# Patient Record
Sex: FEMALE | Race: WHITE | Employment: OTHER | URBAN - METROPOLITAN AREA
[De-identification: names, ages, dates, MRNs, and addresses within clinical notes are randomized per-mention and may not be internally consistent; named-entity substitution may affect disease eponyms.]

---

## 2017-01-01 ENCOUNTER — HOSPITAL ENCOUNTER (OUTPATIENT)
Dept: LAB | Age: 82
Discharge: HOME | End: 2017-01-26
Payer: MEDICARE

## 2017-01-01 ENCOUNTER — DOCUMENTATION ONLY (OUTPATIENT)
Dept: INTERNAL MEDICINE CLINIC | Age: 82
End: 2017-01-01

## 2017-01-01 ENCOUNTER — HOSPITAL ENCOUNTER (INPATIENT)
Age: 82
LOS: 2 days | Discharge: HOSPICE | DRG: 871 | End: 2017-10-14
Attending: EMERGENCY MEDICINE | Admitting: HOSPITALIST
Payer: MEDICARE

## 2017-01-01 ENCOUNTER — TELEPHONE (OUTPATIENT)
Dept: INTERNAL MEDICINE CLINIC | Age: 82
End: 2017-01-01

## 2017-01-01 ENCOUNTER — APPOINTMENT (OUTPATIENT)
Dept: GENERAL RADIOLOGY | Age: 82
DRG: 689 | End: 2017-01-01
Attending: EMERGENCY MEDICINE
Payer: MEDICARE

## 2017-01-01 ENCOUNTER — APPOINTMENT (OUTPATIENT)
Dept: GENERAL RADIOLOGY | Age: 82
DRG: 871 | End: 2017-01-01
Attending: PHYSICIAN ASSISTANT
Payer: MEDICARE

## 2017-01-01 ENCOUNTER — HOSPICE ADMISSION (OUTPATIENT)
Dept: HOSPICE | Facility: HOSPICE | Age: 82
End: 2017-01-01
Payer: MEDICARE

## 2017-01-01 ENCOUNTER — OFFICE VISIT (OUTPATIENT)
Dept: INTERNAL MEDICINE CLINIC | Age: 82
End: 2017-01-01

## 2017-01-01 ENCOUNTER — HOSPITAL ENCOUNTER (INPATIENT)
Age: 82
LOS: 4 days | End: 2017-10-18
Attending: INTERNAL MEDICINE | Admitting: INTERNAL MEDICINE
Payer: MEDICARE

## 2017-01-01 ENCOUNTER — APPOINTMENT (OUTPATIENT)
Dept: CT IMAGING | Age: 82
DRG: 689 | End: 2017-01-01
Attending: EMERGENCY MEDICINE
Payer: MEDICARE

## 2017-01-01 ENCOUNTER — PATIENT OUTREACH (OUTPATIENT)
Dept: INTERNAL MEDICINE CLINIC | Age: 82
End: 2017-01-01

## 2017-01-01 ENCOUNTER — HOSPITAL ENCOUNTER (INPATIENT)
Age: 82
LOS: 3 days | Discharge: HOME HEALTH | DRG: 689 | End: 2017-03-20
Attending: EMERGENCY MEDICINE | Admitting: INTERNAL MEDICINE
Payer: MEDICARE

## 2017-01-01 VITALS
BODY MASS INDEX: 27.71 KG/M2 | RESPIRATION RATE: 20 BRPM | DIASTOLIC BLOOD PRESSURE: 60 MMHG | HEIGHT: 63 IN | SYSTOLIC BLOOD PRESSURE: 140 MMHG | WEIGHT: 156.4 LBS | OXYGEN SATURATION: 96 % | HEART RATE: 62 BPM | TEMPERATURE: 96 F

## 2017-01-01 VITALS
HEIGHT: 63 IN | SYSTOLIC BLOOD PRESSURE: 98 MMHG | OXYGEN SATURATION: 97 % | BODY MASS INDEX: 26.4 KG/M2 | HEART RATE: 56 BPM | DIASTOLIC BLOOD PRESSURE: 44 MMHG | TEMPERATURE: 93.5 F | WEIGHT: 149 LBS | RESPIRATION RATE: 16 BRPM

## 2017-01-01 VITALS
HEIGHT: 63 IN | HEART RATE: 59 BPM | WEIGHT: 149.5 LBS | TEMPERATURE: 96.4 F | DIASTOLIC BLOOD PRESSURE: 60 MMHG | OXYGEN SATURATION: 98 % | SYSTOLIC BLOOD PRESSURE: 110 MMHG | BODY MASS INDEX: 26.49 KG/M2 | RESPIRATION RATE: 18 BRPM

## 2017-01-01 VITALS
HEART RATE: 58 BPM | WEIGHT: 166.6 LBS | SYSTOLIC BLOOD PRESSURE: 116 MMHG | DIASTOLIC BLOOD PRESSURE: 68 MMHG | BODY MASS INDEX: 29.52 KG/M2 | OXYGEN SATURATION: 97 % | HEIGHT: 63 IN

## 2017-01-01 VITALS
SYSTOLIC BLOOD PRESSURE: 90 MMHG | OXYGEN SATURATION: 88 % | HEART RATE: 48 BPM | RESPIRATION RATE: 16 BRPM | DIASTOLIC BLOOD PRESSURE: 54 MMHG | TEMPERATURE: 97.1 F

## 2017-01-01 VITALS
HEIGHT: 63 IN | SYSTOLIC BLOOD PRESSURE: 140 MMHG | RESPIRATION RATE: 18 BRPM | DIASTOLIC BLOOD PRESSURE: 57 MMHG | TEMPERATURE: 96 F | OXYGEN SATURATION: 99 % | WEIGHT: 167.33 LBS | BODY MASS INDEX: 29.65 KG/M2 | HEART RATE: 95 BPM

## 2017-01-01 DIAGNOSIS — N30.00 ACUTE CYSTITIS WITHOUT HEMATURIA: Primary | ICD-10-CM

## 2017-01-01 DIAGNOSIS — R06.02 SHORTNESS OF BREATH: ICD-10-CM

## 2017-01-01 DIAGNOSIS — I48.0 PAROXYSMAL ATRIAL FIBRILLATION (HCC): ICD-10-CM

## 2017-01-01 DIAGNOSIS — R31.9 URINARY TRACT INFECTION WITH HEMATURIA, SITE UNSPECIFIED: Primary | ICD-10-CM

## 2017-01-01 DIAGNOSIS — I10 ESSENTIAL HYPERTENSION: ICD-10-CM

## 2017-01-01 DIAGNOSIS — N39.0 URINARY TRACT INFECTION WITH HEMATURIA, SITE UNSPECIFIED: Primary | ICD-10-CM

## 2017-01-01 DIAGNOSIS — K11.7 INCREASED OROPHARYNGEAL SECRETIONS: ICD-10-CM

## 2017-01-01 DIAGNOSIS — Z13.39 SCREENING FOR ALCOHOLISM: ICD-10-CM

## 2017-01-01 DIAGNOSIS — F03.C0 ADVANCED DEMENTIA: ICD-10-CM

## 2017-01-01 DIAGNOSIS — I87.2 VENOUS INSUFFICIENCY OF BOTH LOWER EXTREMITIES: ICD-10-CM

## 2017-01-01 DIAGNOSIS — A41.9 SEPSIS, DUE TO UNSPECIFIED ORGANISM: Primary | ICD-10-CM

## 2017-01-01 DIAGNOSIS — E03.4 HYPOTHYROIDISM DUE TO ACQUIRED ATROPHY OF THYROID: ICD-10-CM

## 2017-01-01 DIAGNOSIS — E87.0 HYPERNATREMIA: ICD-10-CM

## 2017-01-01 DIAGNOSIS — R31.9 URINARY TRACT INFECTION WITH HEMATURIA, SITE UNSPECIFIED: ICD-10-CM

## 2017-01-01 DIAGNOSIS — A41.9 SEPSIS, DUE TO UNSPECIFIED ORGANISM: ICD-10-CM

## 2017-01-01 DIAGNOSIS — N39.0 URINARY TRACT INFECTION WITH HEMATURIA, SITE UNSPECIFIED: ICD-10-CM

## 2017-01-01 DIAGNOSIS — G31.84 MILD COGNITIVE IMPAIRMENT WITH MEMORY LOSS: ICD-10-CM

## 2017-01-01 DIAGNOSIS — M81.0 OSTEOPOROSIS: Primary | ICD-10-CM

## 2017-01-01 DIAGNOSIS — N17.9 AKI (ACUTE KIDNEY INJURY) (HCC): ICD-10-CM

## 2017-01-01 DIAGNOSIS — Z79.01 CHRONIC ANTICOAGULATION: ICD-10-CM

## 2017-01-01 DIAGNOSIS — R52 GENERALIZED PAIN: ICD-10-CM

## 2017-01-01 DIAGNOSIS — Z00.00 ROUTINE GENERAL MEDICAL EXAMINATION AT A HEALTH CARE FACILITY: Primary | ICD-10-CM

## 2017-01-01 DIAGNOSIS — G93.40 ACUTE ENCEPHALOPATHY: ICD-10-CM

## 2017-01-01 DIAGNOSIS — F41.8 DEPRESSION WITH ANXIETY: ICD-10-CM

## 2017-01-01 DIAGNOSIS — E44.0 MODERATE PROTEIN-CALORIE MALNUTRITION (HCC): ICD-10-CM

## 2017-01-01 LAB
25(OH)D3+25(OH)D2 SERPL-MCNC: 35.8 NG/ML (ref 30–100)
ALBUMIN SERPL BCP-MCNC: 2.6 G/DL (ref 3.5–5)
ALBUMIN SERPL BCP-MCNC: 3.1 G/DL (ref 3.5–5)
ALBUMIN SERPL-MCNC: 1.8 G/DL (ref 3.5–5)
ALBUMIN SERPL-MCNC: 2.4 G/DL (ref 3.5–5)
ALBUMIN SERPL-MCNC: 3.9 G/DL (ref 3.2–4.6)
ALBUMIN/GLOB SERPL: 0.5 {RATIO} (ref 1.1–2.2)
ALBUMIN/GLOB SERPL: 0.5 {RATIO} (ref 1.1–2.2)
ALBUMIN/GLOB SERPL: 0.7 {RATIO} (ref 1.1–2.2)
ALBUMIN/GLOB SERPL: 0.7 {RATIO} (ref 1.1–2.2)
ALBUMIN/GLOB SERPL: 1.4 {RATIO} (ref 1.1–2.5)
ALP SERPL-CCNC: 102 U/L (ref 45–117)
ALP SERPL-CCNC: 118 U/L (ref 45–117)
ALP SERPL-CCNC: 120 U/L (ref 45–117)
ALP SERPL-CCNC: 158 U/L (ref 45–117)
ALP SERPL-CCNC: 96 IU/L (ref 39–117)
ALT SERPL-CCNC: 21 IU/L (ref 0–32)
ALT SERPL-CCNC: 39 U/L (ref 12–78)
ALT SERPL-CCNC: 42 U/L (ref 12–78)
ALT SERPL-CCNC: 48 U/L (ref 12–78)
ALT SERPL-CCNC: 65 U/L (ref 12–78)
ANION GAP BLD CALC-SCNC: 10 MMOL/L (ref 5–15)
ANION GAP BLD CALC-SCNC: 11 MMOL/L (ref 5–15)
ANION GAP BLD CALC-SCNC: 8 MMOL/L (ref 5–15)
ANION GAP SERPL CALC-SCNC: 5 MMOL/L (ref 5–15)
ANION GAP SERPL CALC-SCNC: 6 MMOL/L (ref 5–15)
APPEARANCE UR: ABNORMAL
APPEARANCE UR: ABNORMAL
AST SERPL W P-5'-P-CCNC: 41 U/L (ref 15–37)
AST SERPL W P-5'-P-CCNC: 46 U/L (ref 15–37)
AST SERPL-CCNC: 25 IU/L (ref 0–40)
AST SERPL-CCNC: 37 U/L (ref 15–37)
AST SERPL-CCNC: ABNORMAL U/L (ref 15–37)
ATRIAL RATE: 45 BPM
ATRIAL RATE: 64 BPM
BACTERIA SPEC CULT: ABNORMAL
BACTERIA SPEC CULT: ABNORMAL
BACTERIA SPEC CULT: NORMAL
BACTERIA SPEC CULT: NORMAL
BACTERIA URNS QL MICRO: ABNORMAL /HPF
BACTERIA URNS QL MICRO: ABNORMAL /HPF
BASOPHILS # BLD AUTO: 0 K/UL (ref 0–0.1)
BASOPHILS # BLD AUTO: 0 K/UL (ref 0–0.1)
BASOPHILS # BLD: 0 % (ref 0–1)
BASOPHILS # BLD: 0 % (ref 0–1)
BASOPHILS # BLD: 0 K/UL (ref 0–0.1)
BASOPHILS NFR BLD: 0 % (ref 0–1)
BILIRUB SERPL-MCNC: 0.3 MG/DL (ref 0–1.2)
BILIRUB SERPL-MCNC: 0.5 MG/DL (ref 0.2–1)
BILIRUB SERPL-MCNC: 0.5 MG/DL (ref 0.2–1)
BILIRUB SERPL-MCNC: 0.6 MG/DL (ref 0.2–1)
BILIRUB SERPL-MCNC: 0.8 MG/DL (ref 0.2–1)
BILIRUB UR QL: NEGATIVE
BILIRUB UR QL: NEGATIVE
BUN SERPL-MCNC: 107 MG/DL (ref 6–20)
BUN SERPL-MCNC: 22 MG/DL (ref 6–20)
BUN SERPL-MCNC: 30 MG/DL (ref 6–20)
BUN SERPL-MCNC: 36 MG/DL (ref 10–36)
BUN SERPL-MCNC: 38 MG/DL (ref 6–20)
BUN SERPL-MCNC: 92 MG/DL (ref 6–20)
BUN/CREAT SERPL: 22 (ref 12–20)
BUN/CREAT SERPL: 26 (ref 12–20)
BUN/CREAT SERPL: 27 (ref 12–20)
BUN/CREAT SERPL: 29 (ref 12–20)
BUN/CREAT SERPL: 35 (ref 12–20)
BUN/CREAT SERPL: 37 (ref 11–26)
CALCIUM SERPL-MCNC: 10.1 MG/DL (ref 8.5–10.1)
CALCIUM SERPL-MCNC: 10.1 MG/DL (ref 8.7–10.3)
CALCIUM SERPL-MCNC: 10.5 MG/DL (ref 8.5–10.1)
CALCIUM SERPL-MCNC: 8.4 MG/DL (ref 8.5–10.1)
CALCIUM SERPL-MCNC: 9.6 MG/DL (ref 8.5–10.1)
CALCIUM SERPL-MCNC: 9.9 MG/DL (ref 8.5–10.1)
CALCULATED P AXIS, ECG09: 43 DEGREES
CALCULATED P AXIS, ECG09: 56 DEGREES
CALCULATED R AXIS, ECG10: -39 DEGREES
CALCULATED R AXIS, ECG10: -49 DEGREES
CALCULATED T AXIS, ECG11: 16 DEGREES
CALCULATED T AXIS, ECG11: 24 DEGREES
CC UR VC: ABNORMAL
CC UR VC: ABNORMAL
CHLORIDE SERPL-SCNC: 101 MMOL/L (ref 96–106)
CHLORIDE SERPL-SCNC: 106 MMOL/L (ref 97–108)
CHLORIDE SERPL-SCNC: 108 MMOL/L (ref 97–108)
CHLORIDE SERPL-SCNC: 109 MMOL/L (ref 97–108)
CHLORIDE SERPL-SCNC: 119 MMOL/L (ref 97–108)
CHLORIDE SERPL-SCNC: 121 MMOL/L (ref 97–108)
CK MB CFR SERPL CALC: 4.7 % (ref 0–2.5)
CK MB CFR SERPL CALC: 5 % (ref 0–2.5)
CK MB CFR SERPL CALC: 5.5 % (ref 0–2.5)
CK MB SERPL-MCNC: 14.8 NG/ML (ref 5–25)
CK MB SERPL-MCNC: 18.2 NG/ML (ref 5–25)
CK MB SERPL-MCNC: 9.1 NG/ML (ref 5–25)
CK SERPL-CCNC: 193 U/L (ref 26–192)
CK SERPL-CCNC: 269 U/L (ref 26–192)
CK SERPL-CCNC: 363 U/L (ref 26–192)
CO2 SERPL-SCNC: 23 MMOL/L (ref 21–32)
CO2 SERPL-SCNC: 25 MMOL/L (ref 18–29)
CO2 SERPL-SCNC: 25 MMOL/L (ref 21–32)
CO2 SERPL-SCNC: 26 MMOL/L (ref 21–32)
CO2 SERPL-SCNC: 27 MMOL/L (ref 21–32)
CO2 SERPL-SCNC: 28 MMOL/L (ref 21–32)
COLOR UR: ABNORMAL
COLOR UR: YELLOW
CORTIS SERPL-MCNC: 29.6 UG/DL
CREAT SERPL-MCNC: 0.98 MG/DL (ref 0.55–1.02)
CREAT SERPL-MCNC: 0.98 MG/DL (ref 0.57–1)
CREAT SERPL-MCNC: 1.04 MG/DL (ref 0.55–1.02)
CREAT SERPL-MCNC: 1.1 MG/DL (ref 0.55–1.02)
CREAT SERPL-MCNC: 3.45 MG/DL (ref 0.55–1.02)
CREAT SERPL-MCNC: 4.11 MG/DL (ref 0.55–1.02)
DIAGNOSIS, 93000: NORMAL
DIAGNOSIS, 93000: NORMAL
DIFFERENTIAL METHOD BLD: ABNORMAL
EOSINOPHIL # BLD: 0.1 K/UL (ref 0–0.4)
EOSINOPHIL # BLD: 0.1 K/UL (ref 0–0.4)
EOSINOPHIL # BLD: 0.2 K/UL (ref 0–0.4)
EOSINOPHIL NFR BLD: 1 % (ref 0–7)
EOSINOPHIL NFR BLD: 1 % (ref 0–7)
EOSINOPHIL NFR BLD: 3 % (ref 0–7)
EPITH CASTS URNS QL MICRO: ABNORMAL /LPF
EPITH CASTS URNS QL MICRO: ABNORMAL /LPF
ERYTHROCYTE [DISTWIDTH] IN BLOOD BY AUTOMATED COUNT: 16.2 % (ref 12.3–15.4)
ERYTHROCYTE [DISTWIDTH] IN BLOOD BY AUTOMATED COUNT: 16.8 % (ref 11.5–14.5)
ERYTHROCYTE [DISTWIDTH] IN BLOOD BY AUTOMATED COUNT: 16.9 % (ref 11.5–14.5)
ERYTHROCYTE [DISTWIDTH] IN BLOOD BY AUTOMATED COUNT: 23.5 % (ref 11.5–14.5)
GLOBULIN SER CALC-MCNC: 2.8 G/DL (ref 1.5–4.5)
GLOBULIN SER CALC-MCNC: 3.5 G/DL (ref 2–4)
GLOBULIN SER CALC-MCNC: 3.9 G/DL (ref 2–4)
GLOBULIN SER CALC-MCNC: 4.3 G/DL (ref 2–4)
GLOBULIN SER CALC-MCNC: 5.2 G/DL (ref 2–4)
GLUCOSE BLD STRIP.AUTO-MCNC: 92 MG/DL (ref 65–100)
GLUCOSE SERPL-MCNC: 106 MG/DL (ref 65–100)
GLUCOSE SERPL-MCNC: 67 MG/DL (ref 65–100)
GLUCOSE SERPL-MCNC: 72 MG/DL (ref 65–100)
GLUCOSE SERPL-MCNC: 78 MG/DL (ref 65–100)
GLUCOSE SERPL-MCNC: 89 MG/DL (ref 65–100)
GLUCOSE SERPL-MCNC: 92 MG/DL (ref 65–99)
GLUCOSE UR STRIP.AUTO-MCNC: NEGATIVE MG/DL
GLUCOSE UR STRIP.AUTO-MCNC: NEGATIVE MG/DL
HCT VFR BLD AUTO: 33.4 % (ref 35–47)
HCT VFR BLD AUTO: 36.2 % (ref 34–46.6)
HCT VFR BLD AUTO: 36.2 % (ref 35–47)
HCT VFR BLD AUTO: 37.8 % (ref 35–47)
HGB BLD-MCNC: 10.8 G/DL (ref 11.5–16)
HGB BLD-MCNC: 11.8 G/DL (ref 11.5–16)
HGB BLD-MCNC: 12 G/DL (ref 11.1–15.9)
HGB BLD-MCNC: 12 G/DL (ref 11.5–16)
HGB UR QL STRIP: ABNORMAL
HGB UR QL STRIP: NEGATIVE
INR PPP: 1.3 (ref 0.9–1.1)
INTERPRETATION: NORMAL
KETONES UR QL STRIP.AUTO: ABNORMAL MG/DL
KETONES UR QL STRIP.AUTO: NEGATIVE MG/DL
LACTATE SERPL-SCNC: 0.8 MMOL/L (ref 0.4–2)
LACTATE SERPL-SCNC: 1.5 MMOL/L (ref 0.4–2)
LEUKOCYTE ESTERASE UR QL STRIP.AUTO: ABNORMAL
LEUKOCYTE ESTERASE UR QL STRIP.AUTO: ABNORMAL
LYMPHOCYTES # BLD AUTO: 15 % (ref 12–49)
LYMPHOCYTES # BLD AUTO: 21 % (ref 12–49)
LYMPHOCYTES # BLD: 1 K/UL (ref 0.8–3.5)
LYMPHOCYTES # BLD: 1.1 K/UL (ref 0.8–3.5)
LYMPHOCYTES # BLD: 1.3 K/UL (ref 0.8–3.5)
LYMPHOCYTES NFR BLD: 15 % (ref 12–49)
MAGNESIUM SERPL-MCNC: 1.7 MG/DL (ref 1.6–2.4)
MAGNESIUM SERPL-MCNC: 2.1 MG/DL (ref 1.6–2.4)
MCH RBC QN AUTO: 26 PG (ref 26–34)
MCH RBC QN AUTO: 27.6 PG (ref 26–34)
MCH RBC QN AUTO: 27.6 PG (ref 26–34)
MCH RBC QN AUTO: 27.9 PG (ref 26.6–33)
MCHC RBC AUTO-ENTMCNC: 31.7 G/DL (ref 30–36.5)
MCHC RBC AUTO-ENTMCNC: 32.3 G/DL (ref 30–36.5)
MCHC RBC AUTO-ENTMCNC: 32.6 G/DL (ref 30–36.5)
MCHC RBC AUTO-ENTMCNC: 33.1 G/DL (ref 31.5–35.7)
MCV RBC AUTO: 81.8 FL (ref 80–99)
MCV RBC AUTO: 84 FL (ref 79–97)
MCV RBC AUTO: 84.6 FL (ref 80–99)
MCV RBC AUTO: 85.4 FL (ref 80–99)
MONOCYTES # BLD: 0.5 K/UL (ref 0–1)
MONOCYTES # BLD: 0.5 K/UL (ref 0–1)
MONOCYTES # BLD: 0.8 K/UL (ref 0–1)
MONOCYTES NFR BLD AUTO: 8 % (ref 5–13)
MONOCYTES NFR BLD AUTO: 8 % (ref 5–13)
MONOCYTES NFR BLD: 9 % (ref 5–13)
NEUTS SEG # BLD: 3.8 K/UL (ref 1.8–8)
NEUTS SEG # BLD: 4.8 K/UL (ref 1.8–8)
NEUTS SEG # BLD: 6.4 K/UL (ref 1.8–8)
NEUTS SEG NFR BLD AUTO: 70 % (ref 32–75)
NEUTS SEG NFR BLD AUTO: 74 % (ref 32–75)
NEUTS SEG NFR BLD: 75 % (ref 32–75)
NITRITE UR QL STRIP.AUTO: POSITIVE
NITRITE UR QL STRIP.AUTO: POSITIVE
P-R INTERVAL, ECG05: 210 MS
P-R INTERVAL, ECG05: 224 MS
PH UR STRIP: 6 [PH] (ref 5–8)
PH UR STRIP: 6 [PH] (ref 5–8)
PHOSPHATE SERPL-MCNC: 1.8 MG/DL (ref 2.6–4.7)
PHOSPHATE SERPL-MCNC: 2.1 MG/DL (ref 2.6–4.7)
PLATELET # BLD AUTO: 195 K/UL (ref 150–400)
PLATELET # BLD AUTO: 202 X10E3/UL (ref 150–379)
PLATELET # BLD AUTO: 203 K/UL (ref 150–400)
PLATELET # BLD AUTO: 213 K/UL (ref 150–400)
POTASSIUM SERPL-SCNC: 3.4 MMOL/L (ref 3.5–5.1)
POTASSIUM SERPL-SCNC: 3.7 MMOL/L (ref 3.5–5.1)
POTASSIUM SERPL-SCNC: 4.5 MMOL/L (ref 3.5–5.1)
POTASSIUM SERPL-SCNC: 4.6 MMOL/L (ref 3.5–5.1)
POTASSIUM SERPL-SCNC: 5 MMOL/L (ref 3.5–5.2)
POTASSIUM SERPL-SCNC: ABNORMAL MMOL/L (ref 3.5–5.1)
PROT SERPL-MCNC: 5.3 G/DL (ref 6.4–8.2)
PROT SERPL-MCNC: 6.5 G/DL (ref 6.4–8.2)
PROT SERPL-MCNC: 6.7 G/DL (ref 6–8.5)
PROT SERPL-MCNC: 7.4 G/DL (ref 6.4–8.2)
PROT SERPL-MCNC: 7.6 G/DL (ref 6.4–8.2)
PROT UR STRIP-MCNC: 100 MG/DL
PROT UR STRIP-MCNC: NEGATIVE MG/DL
PROTHROMBIN TIME: 12.7 SEC (ref 9–11.1)
Q-T INTERVAL, ECG07: 434 MS
Q-T INTERVAL, ECG07: 532 MS
QRS DURATION, ECG06: 104 MS
QRS DURATION, ECG06: 112 MS
QTC CALCULATION (BEZET), ECG08: 447 MS
QTC CALCULATION (BEZET), ECG08: 460 MS
RBC # BLD AUTO: 3.91 M/UL (ref 3.8–5.2)
RBC # BLD AUTO: 4.28 M/UL (ref 3.8–5.2)
RBC # BLD AUTO: 4.3 X10E6/UL (ref 3.77–5.28)
RBC # BLD AUTO: 4.62 M/UL (ref 3.8–5.2)
RBC #/AREA URNS HPF: ABNORMAL /HPF (ref 0–5)
RBC #/AREA URNS HPF: ABNORMAL /HPF (ref 0–5)
RBC MORPH BLD: ABNORMAL
RBC MORPH BLD: ABNORMAL
SERVICE CMNT-IMP: ABNORMAL
SERVICE CMNT-IMP: ABNORMAL
SERVICE CMNT-IMP: NORMAL
SODIUM SERPL-SCNC: 140 MMOL/L (ref 136–145)
SODIUM SERPL-SCNC: 141 MMOL/L (ref 134–144)
SODIUM SERPL-SCNC: 144 MMOL/L (ref 136–145)
SODIUM SERPL-SCNC: 144 MMOL/L (ref 136–145)
SODIUM SERPL-SCNC: 150 MMOL/L (ref 136–145)
SODIUM SERPL-SCNC: 154 MMOL/L (ref 136–145)
SP GR UR REFRACTOMETRY: 1.02 (ref 1–1.03)
SP GR UR REFRACTOMETRY: 1.02 (ref 1–1.03)
T3FREE SERPL-MCNC: 2.1 PG/ML (ref 2.2–4)
T4 FREE SERPL-MCNC: 1.2 NG/DL (ref 0.8–1.5)
T4 FREE SERPL-MCNC: 1.3 NG/DL (ref 0.8–1.5)
TROPONIN I SERPL-MCNC: <0.04 NG/ML
TSH SERPL DL<=0.05 MIU/L-ACNC: 3.77 UIU/ML (ref 0.36–3.74)
TSH SERPL DL<=0.05 MIU/L-ACNC: 6.29 UIU/ML (ref 0.36–3.74)
TSH SERPL DL<=0.05 MIU/L-ACNC: 7.04 UIU/ML (ref 0.36–3.74)
UROBILINOGEN UR QL STRIP.AUTO: 0.2 EU/DL (ref 0.2–1)
UROBILINOGEN UR QL STRIP.AUTO: 0.2 EU/DL (ref 0.2–1)
VENTRICULAR RATE, ECG03: 45 BPM
VENTRICULAR RATE, ECG03: 64 BPM
WBC # BLD AUTO: 5.5 K/UL (ref 3.6–11)
WBC # BLD AUTO: 6.1 X10E3/UL (ref 3.4–10.8)
WBC # BLD AUTO: 6.5 K/UL (ref 3.6–11)
WBC # BLD AUTO: 8.6 K/UL (ref 3.6–11)
WBC URNS QL MICRO: >100 /HPF (ref 0–4)
WBC URNS QL MICRO: ABNORMAL /HPF (ref 0–4)

## 2017-01-01 PROCEDURE — 82550 ASSAY OF CK (CPK): CPT | Performed by: EMERGENCY MEDICINE

## 2017-01-01 PROCEDURE — 74011250636 HC RX REV CODE- 250/636: Performed by: INTERNAL MEDICINE

## 2017-01-01 PROCEDURE — 80053 COMPREHEN METABOLIC PANEL: CPT

## 2017-01-01 PROCEDURE — 80048 BASIC METABOLIC PNL TOTAL CA: CPT | Performed by: INTERNAL MEDICINE

## 2017-01-01 PROCEDURE — 93005 ELECTROCARDIOGRAM TRACING: CPT

## 2017-01-01 PROCEDURE — 83735 ASSAY OF MAGNESIUM: CPT | Performed by: HOSPITALIST

## 2017-01-01 PROCEDURE — 82306 VITAMIN D 25 HYDROXY: CPT

## 2017-01-01 PROCEDURE — 99223 1ST HOSP IP/OBS HIGH 75: CPT | Performed by: INTERNAL MEDICINE

## 2017-01-01 PROCEDURE — 87077 CULTURE AEROBIC IDENTIFY: CPT | Performed by: INTERNAL MEDICINE

## 2017-01-01 PROCEDURE — 85025 COMPLETE CBC W/AUTO DIFF WBC: CPT | Performed by: EMERGENCY MEDICINE

## 2017-01-01 PROCEDURE — 77030005563 HC CATH URETH INT MMGH -A

## 2017-01-01 PROCEDURE — 74011250637 HC RX REV CODE- 250/637: Performed by: INTERNAL MEDICINE

## 2017-01-01 PROCEDURE — 3336590001 HSPC ROOM AND BOARD

## 2017-01-01 PROCEDURE — 74011250636 HC RX REV CODE- 250/636: Performed by: NURSE PRACTITIONER

## 2017-01-01 PROCEDURE — 82533 TOTAL CORTISOL: CPT | Performed by: INTERNAL MEDICINE

## 2017-01-01 PROCEDURE — 70450 CT HEAD/BRAIN W/O DYE: CPT

## 2017-01-01 PROCEDURE — G8988 SELF CARE GOAL STATUS: HCPCS

## 2017-01-01 PROCEDURE — 74011250636 HC RX REV CODE- 250/636: Performed by: EMERGENCY MEDICINE

## 2017-01-01 PROCEDURE — 81001 URINALYSIS AUTO W/SCOPE: CPT | Performed by: EMERGENCY MEDICINE

## 2017-01-01 PROCEDURE — 87086 URINE CULTURE/COLONY COUNT: CPT | Performed by: INTERNAL MEDICINE

## 2017-01-01 PROCEDURE — 74011250636 HC RX REV CODE- 250/636: Performed by: HOSPITALIST

## 2017-01-01 PROCEDURE — 87040 BLOOD CULTURE FOR BACTERIA: CPT | Performed by: EMERGENCY MEDICINE

## 2017-01-01 PROCEDURE — 84484 ASSAY OF TROPONIN QUANT: CPT | Performed by: INTERNAL MEDICINE

## 2017-01-01 PROCEDURE — 84481 FREE ASSAY (FT-3): CPT | Performed by: INTERNAL MEDICINE

## 2017-01-01 PROCEDURE — 74011250636 HC RX REV CODE- 250/636: Performed by: PHYSICIAN ASSISTANT

## 2017-01-01 PROCEDURE — 65270000032 HC RM SEMIPRIVATE

## 2017-01-01 PROCEDURE — 36415 COLL VENOUS BLD VENIPUNCTURE: CPT | Performed by: EMERGENCY MEDICINE

## 2017-01-01 PROCEDURE — 80053 COMPREHEN METABOLIC PANEL: CPT | Performed by: EMERGENCY MEDICINE

## 2017-01-01 PROCEDURE — 0651 HSPC ROUTINE HOME CARE

## 2017-01-01 PROCEDURE — 87086 URINE CULTURE/COLONY COUNT: CPT | Performed by: EMERGENCY MEDICINE

## 2017-01-01 PROCEDURE — 97116 GAIT TRAINING THERAPY: CPT

## 2017-01-01 PROCEDURE — 71020 XR CHEST PA LAT: CPT

## 2017-01-01 PROCEDURE — 84100 ASSAY OF PHOSPHORUS: CPT | Performed by: INTERNAL MEDICINE

## 2017-01-01 PROCEDURE — 77030013079 HC BLNKT BAIR HGGR 3M -A

## 2017-01-01 PROCEDURE — 99285 EMERGENCY DEPT VISIT HI MDM: CPT

## 2017-01-01 PROCEDURE — 82553 CREATINE MB FRACTION: CPT | Performed by: EMERGENCY MEDICINE

## 2017-01-01 PROCEDURE — 87186 SC STD MICRODIL/AGAR DIL: CPT | Performed by: INTERNAL MEDICINE

## 2017-01-01 PROCEDURE — 77030012940 HC DRSG HYDRCOIL BMS -B

## 2017-01-01 PROCEDURE — 74011000258 HC RX REV CODE- 258: Performed by: HOSPITALIST

## 2017-01-01 PROCEDURE — 96361 HYDRATE IV INFUSION ADD-ON: CPT

## 2017-01-01 PROCEDURE — 84100 ASSAY OF PHOSPHORUS: CPT | Performed by: HOSPITALIST

## 2017-01-01 PROCEDURE — 74011000250 HC RX REV CODE- 250: Performed by: HOSPITALIST

## 2017-01-01 PROCEDURE — 87186 SC STD MICRODIL/AGAR DIL: CPT | Performed by: EMERGENCY MEDICINE

## 2017-01-01 PROCEDURE — 84443 ASSAY THYROID STIM HORMONE: CPT | Performed by: PHYSICIAN ASSISTANT

## 2017-01-01 PROCEDURE — 36415 COLL VENOUS BLD VENIPUNCTURE: CPT | Performed by: INTERNAL MEDICINE

## 2017-01-01 PROCEDURE — 97161 PT EVAL LOW COMPLEX 20 MIN: CPT | Performed by: PHYSICAL THERAPIST

## 2017-01-01 PROCEDURE — 84484 ASSAY OF TROPONIN QUANT: CPT | Performed by: EMERGENCY MEDICINE

## 2017-01-01 PROCEDURE — 85025 COMPLETE CBC W/AUTO DIFF WBC: CPT | Performed by: INTERNAL MEDICINE

## 2017-01-01 PROCEDURE — 65270000029 HC RM PRIVATE

## 2017-01-01 PROCEDURE — 80053 COMPREHEN METABOLIC PANEL: CPT | Performed by: INTERNAL MEDICINE

## 2017-01-01 PROCEDURE — 74011250637 HC RX REV CODE- 250/637: Performed by: HOSPITALIST

## 2017-01-01 PROCEDURE — 87077 CULTURE AEROBIC IDENTIFY: CPT | Performed by: EMERGENCY MEDICINE

## 2017-01-01 PROCEDURE — 96365 THER/PROPH/DIAG IV INF INIT: CPT

## 2017-01-01 PROCEDURE — 84443 ASSAY THYROID STIM HORMONE: CPT | Performed by: INTERNAL MEDICINE

## 2017-01-01 PROCEDURE — 74011250637 HC RX REV CODE- 250/637: Performed by: STUDENT IN AN ORGANIZED HEALTH CARE EDUCATION/TRAINING PROGRAM

## 2017-01-01 PROCEDURE — 97535 SELF CARE MNGMENT TRAINING: CPT

## 2017-01-01 PROCEDURE — 83735 ASSAY OF MAGNESIUM: CPT | Performed by: INTERNAL MEDICINE

## 2017-01-01 PROCEDURE — 85610 PROTHROMBIN TIME: CPT | Performed by: EMERGENCY MEDICINE

## 2017-01-01 PROCEDURE — 99233 SBSQ HOSP IP/OBS HIGH 50: CPT | Performed by: INTERNAL MEDICINE

## 2017-01-01 PROCEDURE — 84439 ASSAY OF FREE THYROXINE: CPT | Performed by: PHYSICIAN ASSISTANT

## 2017-01-01 PROCEDURE — 51701 INSERT BLADDER CATHETER: CPT

## 2017-01-01 PROCEDURE — 84439 ASSAY OF FREE THYROXINE: CPT | Performed by: INTERNAL MEDICINE

## 2017-01-01 PROCEDURE — 81001 URINALYSIS AUTO W/SCOPE: CPT | Performed by: PHYSICIAN ASSISTANT

## 2017-01-01 PROCEDURE — G8987 SELF CARE CURRENT STATUS: HCPCS

## 2017-01-01 PROCEDURE — 36415 COLL VENOUS BLD VENIPUNCTURE: CPT | Performed by: HOSPITALIST

## 2017-01-01 PROCEDURE — 3336500001 HSPC ELECTION

## 2017-01-01 PROCEDURE — 80053 COMPREHEN METABOLIC PANEL: CPT | Performed by: HOSPITALIST

## 2017-01-01 PROCEDURE — 97530 THERAPEUTIC ACTIVITIES: CPT | Performed by: PHYSICAL THERAPIST

## 2017-01-01 PROCEDURE — 83605 ASSAY OF LACTIC ACID: CPT | Performed by: PHYSICIAN ASSISTANT

## 2017-01-01 PROCEDURE — 82962 GLUCOSE BLOOD TEST: CPT

## 2017-01-01 PROCEDURE — 83605 ASSAY OF LACTIC ACID: CPT | Performed by: EMERGENCY MEDICINE

## 2017-01-01 PROCEDURE — 97165 OT EVAL LOW COMPLEX 30 MIN: CPT

## 2017-01-01 PROCEDURE — 74011000258 HC RX REV CODE- 258: Performed by: PHYSICIAN ASSISTANT

## 2017-01-01 PROCEDURE — 71010 XR CHEST PORT: CPT

## 2017-01-01 PROCEDURE — 85027 COMPLETE CBC AUTOMATED: CPT

## 2017-01-01 PROCEDURE — 87040 BLOOD CULTURE FOR BACTERIA: CPT | Performed by: PHYSICIAN ASSISTANT

## 2017-01-01 PROCEDURE — 77030020061 HC IV BLD WRMR ADMIN SET 3M -B

## 2017-01-01 PROCEDURE — 84443 ASSAY THYROID STIM HORMONE: CPT | Performed by: EMERGENCY MEDICINE

## 2017-01-01 PROCEDURE — 82550 ASSAY OF CK (CPK): CPT | Performed by: INTERNAL MEDICINE

## 2017-01-01 RX ORDER — AMOXICILLIN 250 MG
1 CAPSULE ORAL
COMMUNITY
End: 2017-01-01

## 2017-01-01 RX ORDER — DILTIAZEM HYDROCHLORIDE 120 MG/1
120 CAPSULE, COATED, EXTENDED RELEASE ORAL DAILY
COMMUNITY
End: 2017-01-01

## 2017-01-01 RX ORDER — FACIAL-BODY WIPES
10 EACH TOPICAL DAILY PRN
Status: DISCONTINUED | OUTPATIENT
Start: 2017-01-01 | End: 2017-01-01 | Stop reason: HOSPADM

## 2017-01-01 RX ORDER — LEVOFLOXACIN 250 MG/1
250 TABLET ORAL DAILY
Qty: 3 TAB | Refills: 0 | Status: SHIPPED | OUTPATIENT
Start: 2017-01-01 | End: 2017-01-01

## 2017-01-01 RX ORDER — ACETAMINOPHEN 650 MG/1
650 SUPPOSITORY RECTAL
Status: DISCONTINUED | OUTPATIENT
Start: 2017-01-01 | End: 2017-01-01 | Stop reason: HOSPADM

## 2017-01-01 RX ORDER — LEVOTHYROXINE SODIUM 25 UG/1
25 TABLET ORAL
Qty: 30 TAB | Refills: 0 | Status: SHIPPED | OUTPATIENT
Start: 2017-01-01 | End: 2017-01-01 | Stop reason: SDUPTHER

## 2017-01-01 RX ORDER — MORPHINE SULFATE 2 MG/ML
1 INJECTION, SOLUTION INTRAMUSCULAR; INTRAVENOUS
Status: DISCONTINUED | OUTPATIENT
Start: 2017-01-01 | End: 2017-01-01 | Stop reason: HOSPADM

## 2017-01-01 RX ORDER — THERA TABS 400 MCG
1 TAB ORAL DAILY
Status: DISCONTINUED | OUTPATIENT
Start: 2017-01-01 | End: 2017-01-01 | Stop reason: HOSPADM

## 2017-01-01 RX ORDER — RANITIDINE HCL 75 MG
75 TABLET ORAL
Status: DISCONTINUED | OUTPATIENT
Start: 2017-01-01 | End: 2017-01-01 | Stop reason: CLARIF

## 2017-01-01 RX ORDER — LEVOFLOXACIN 5 MG/ML
250 INJECTION, SOLUTION INTRAVENOUS EVERY 24 HOURS
Status: DISCONTINUED | OUTPATIENT
Start: 2017-01-01 | End: 2017-01-01

## 2017-01-01 RX ORDER — SCOLOPAMINE TRANSDERMAL SYSTEM 1 MG/1
1.5 PATCH, EXTENDED RELEASE TRANSDERMAL
Status: DISCONTINUED | OUTPATIENT
Start: 2017-01-01 | End: 2017-01-01 | Stop reason: HOSPADM

## 2017-01-01 RX ORDER — LEVOFLOXACIN 5 MG/ML
750 INJECTION, SOLUTION INTRAVENOUS
Status: COMPLETED | OUTPATIENT
Start: 2017-01-01 | End: 2017-01-01

## 2017-01-01 RX ORDER — LEVOFLOXACIN 5 MG/ML
750 INJECTION, SOLUTION INTRAVENOUS ONCE
Status: COMPLETED | OUTPATIENT
Start: 2017-01-01 | End: 2017-01-01

## 2017-01-01 RX ORDER — SODIUM CHLORIDE 9 MG/ML
100 INJECTION, SOLUTION INTRAVENOUS CONTINUOUS
Status: DISCONTINUED | OUTPATIENT
Start: 2017-01-01 | End: 2017-01-01

## 2017-01-01 RX ORDER — DILTIAZEM HYDROCHLORIDE 120 MG/1
120 CAPSULE, COATED, EXTENDED RELEASE ORAL DAILY
Status: DISCONTINUED | OUTPATIENT
Start: 2017-01-01 | End: 2017-01-01 | Stop reason: HOSPADM

## 2017-01-01 RX ORDER — MORPHINE SULFATE 2 MG/ML
0.5 INJECTION, SOLUTION INTRAMUSCULAR; INTRAVENOUS
Status: DISCONTINUED | OUTPATIENT
Start: 2017-01-01 | End: 2017-01-01 | Stop reason: HOSPADM

## 2017-01-01 RX ORDER — CIPROFLOXACIN 250 MG/1
250 TABLET, FILM COATED ORAL 2 TIMES DAILY
Qty: 6 TAB | Refills: 0 | Status: SHIPPED | OUTPATIENT
Start: 2017-01-01 | End: 2017-01-01

## 2017-01-01 RX ORDER — ONDANSETRON 4 MG/1
4 TABLET, ORALLY DISINTEGRATING ORAL
Status: DISCONTINUED | OUTPATIENT
Start: 2017-01-01 | End: 2017-01-01 | Stop reason: HOSPADM

## 2017-01-01 RX ORDER — FERROUS SULFATE, DRIED 160(50) MG
1 TABLET, EXTENDED RELEASE ORAL 2 TIMES DAILY WITH MEALS
Status: DISCONTINUED | OUTPATIENT
Start: 2017-01-01 | End: 2017-01-01 | Stop reason: HOSPADM

## 2017-01-01 RX ORDER — TRIAMTERENE/HYDROCHLOROTHIAZID 37.5-25 MG
1 TABLET ORAL DAILY
Status: DISCONTINUED | OUTPATIENT
Start: 2017-01-01 | End: 2017-01-01 | Stop reason: HOSPADM

## 2017-01-01 RX ORDER — SODIUM CHLORIDE 0.9 % (FLUSH) 0.9 %
5-10 SYRINGE (ML) INJECTION AS NEEDED
Status: DISCONTINUED | OUTPATIENT
Start: 2017-01-01 | End: 2017-01-01 | Stop reason: HOSPADM

## 2017-01-01 RX ORDER — LEVOTHYROXINE SODIUM 25 UG/1
25 TABLET ORAL
Status: DISCONTINUED | OUTPATIENT
Start: 2017-01-01 | End: 2017-01-01 | Stop reason: HOSPADM

## 2017-01-01 RX ORDER — SODIUM CHLORIDE 0.9 % (FLUSH) 0.9 %
5-10 SYRINGE (ML) INJECTION EVERY 8 HOURS
Status: DISCONTINUED | OUTPATIENT
Start: 2017-01-01 | End: 2017-01-01 | Stop reason: HOSPADM

## 2017-01-01 RX ORDER — AMOXICILLIN 250 MG
1 CAPSULE ORAL
Status: DISCONTINUED | OUTPATIENT
Start: 2017-01-01 | End: 2017-01-01 | Stop reason: HOSPADM

## 2017-01-01 RX ORDER — LORAZEPAM 2 MG/ML
0.5 INJECTION INTRAMUSCULAR
Status: DISCONTINUED | OUTPATIENT
Start: 2017-01-01 | End: 2017-01-01 | Stop reason: HOSPADM

## 2017-01-01 RX ORDER — THERA TABS 400 MCG
1 TAB ORAL DAILY
Status: DISCONTINUED | OUTPATIENT
Start: 2017-01-01 | End: 2017-01-01

## 2017-01-01 RX ORDER — LORAZEPAM 2 MG/ML
0.5 INJECTION INTRAMUSCULAR
Status: DISCONTINUED | OUTPATIENT
Start: 2017-01-01 | End: 2017-01-01

## 2017-01-01 RX ORDER — ALENDRONATE SODIUM 70 MG/1
70 TABLET ORAL
Status: DISCONTINUED | OUTPATIENT
Start: 2017-01-01 | End: 2017-01-01

## 2017-01-01 RX ORDER — FAMOTIDINE 20 MG/1
10 TABLET, FILM COATED ORAL
Status: DISCONTINUED | OUTPATIENT
Start: 2017-01-01 | End: 2017-01-01 | Stop reason: HOSPADM

## 2017-01-01 RX ORDER — ACETAMINOPHEN 325 MG/1
650 TABLET ORAL
Status: DISCONTINUED | OUTPATIENT
Start: 2017-01-01 | End: 2017-01-01 | Stop reason: HOSPADM

## 2017-01-01 RX ORDER — LOPERAMIDE HYDROCHLORIDE 2 MG/1
2 CAPSULE ORAL
Status: DISCONTINUED | OUTPATIENT
Start: 2017-01-01 | End: 2017-01-01 | Stop reason: HOSPADM

## 2017-01-01 RX ORDER — LEVOTHYROXINE SODIUM 50 UG/1
25 TABLET ORAL
Status: DISCONTINUED | OUTPATIENT
Start: 2017-01-01 | End: 2017-01-01

## 2017-01-01 RX ORDER — ONDANSETRON 2 MG/ML
4 INJECTION INTRAMUSCULAR; INTRAVENOUS
Status: DISCONTINUED | OUTPATIENT
Start: 2017-01-01 | End: 2017-01-01 | Stop reason: HOSPADM

## 2017-01-01 RX ORDER — ACETAMINOPHEN 325 MG/1
650 TABLET ORAL
COMMUNITY
End: 2017-01-01 | Stop reason: SDUPTHER

## 2017-01-01 RX ORDER — LOPERAMIDE HYDROCHLORIDE 2 MG/1
2 CAPSULE ORAL
COMMUNITY
End: 2017-01-01

## 2017-01-01 RX ORDER — SODIUM CHLORIDE 9 MG/ML
100 INJECTION, SOLUTION INTRAVENOUS CONTINUOUS
Status: DISPENSED | OUTPATIENT
Start: 2017-01-01 | End: 2017-01-01

## 2017-01-01 RX ORDER — SODIUM CHLORIDE 0.9 % (FLUSH) 0.9 %
5-10 SYRINGE (ML) INJECTION EVERY 8 HOURS
Status: DISCONTINUED | OUTPATIENT
Start: 2017-01-01 | End: 2017-01-01

## 2017-01-01 RX ORDER — MORPHINE SULFATE 2 MG/ML
0.5 INJECTION, SOLUTION INTRAMUSCULAR; INTRAVENOUS
Status: DISCONTINUED | OUTPATIENT
Start: 2017-01-01 | End: 2017-01-01

## 2017-01-01 RX ORDER — MORPHINE SULFATE 10 MG/ML
0.5 INJECTION, SOLUTION INTRAMUSCULAR; INTRAVENOUS
Status: DISCONTINUED | OUTPATIENT
Start: 2017-01-01 | End: 2017-01-01

## 2017-01-01 RX ORDER — GLYCOPYRROLATE 0.2 MG/ML
0.2 INJECTION INTRAMUSCULAR; INTRAVENOUS
Status: DISCONTINUED | OUTPATIENT
Start: 2017-01-01 | End: 2017-01-01 | Stop reason: HOSPADM

## 2017-01-01 RX ORDER — LEVOFLOXACIN 250 MG/1
250 TABLET ORAL EVERY 24 HOURS
Status: DISCONTINUED | OUTPATIENT
Start: 2017-01-01 | End: 2017-01-01 | Stop reason: HOSPADM

## 2017-01-01 RX ORDER — LEVOTHYROXINE SODIUM 25 UG/1
25 TABLET ORAL
Qty: 30 TAB | Refills: 0 | Status: SHIPPED | OUTPATIENT
Start: 2017-01-01 | End: 2017-01-01

## 2017-01-01 RX ORDER — THERA TABS 400 MCG
1 TAB ORAL DAILY
COMMUNITY
End: 2017-01-01

## 2017-01-01 RX ORDER — SENNOSIDES 8.8 MG/5ML
5 LIQUID ORAL
Status: DISCONTINUED | OUTPATIENT
Start: 2017-01-01 | End: 2017-01-01 | Stop reason: HOSPADM

## 2017-01-01 RX ORDER — ACETAMINOPHEN 325 MG/1
650 TABLET ORAL
Qty: 100 TAB | Refills: 2 | Status: SHIPPED | OUTPATIENT
Start: 2017-01-01 | End: 2017-01-01

## 2017-01-01 RX ORDER — ALENDRONATE SODIUM 70 MG/1
70 TABLET ORAL
COMMUNITY
End: 2017-01-01

## 2017-01-01 RX ORDER — RANITIDINE HCL 75 MG
75 TABLET ORAL
COMMUNITY
End: 2017-01-01

## 2017-01-01 RX ORDER — LEVOTHYROXINE SODIUM 25 UG/1
25 TABLET ORAL
Qty: 30 TAB | Refills: 3 | Status: SHIPPED | OUTPATIENT
Start: 2017-01-01 | End: 2017-01-01

## 2017-01-01 RX ADMIN — MORPHINE SULFATE 0.5 MG: 2 INJECTION, SOLUTION INTRAMUSCULAR; INTRAVENOUS at 06:48

## 2017-01-01 RX ADMIN — MORPHINE SULFATE 0.5 MG: 2 INJECTION, SOLUTION INTRAMUSCULAR; INTRAVENOUS at 07:44

## 2017-01-01 RX ADMIN — DEXTROSE MONOHYDRATE AND SODIUM CHLORIDE: 5; .225 INJECTION, SOLUTION INTRAVENOUS at 07:13

## 2017-01-01 RX ADMIN — RIVAROXABAN 15 MG: 15 TABLET, FILM COATED ORAL at 11:58

## 2017-01-01 RX ADMIN — Medication 10 ML: at 15:44

## 2017-01-01 RX ADMIN — FAMOTIDINE 10 MG: 20 TABLET ORAL at 09:01

## 2017-01-01 RX ADMIN — LEVOFLOXACIN 250 MG: 250 TABLET, FILM COATED ORAL at 16:26

## 2017-01-01 RX ADMIN — CALCIUM CARBONATE-VITAMIN D TAB 500 MG-200 UNIT 1 TABLET: 500-200 TAB at 16:33

## 2017-01-01 RX ADMIN — LEVOFLOXACIN 750 MG: 5 INJECTION, SOLUTION INTRAVENOUS at 16:46

## 2017-01-01 RX ADMIN — MORPHINE SULFATE 0.5 MG: 2 INJECTION, SOLUTION INTRAMUSCULAR; INTRAVENOUS at 16:42

## 2017-01-01 RX ADMIN — LORAZEPAM 0.5 MG: 2 INJECTION INTRAMUSCULAR at 13:58

## 2017-01-01 RX ADMIN — Medication 10 ML: at 07:32

## 2017-01-01 RX ADMIN — DILTIAZEM HYDROCHLORIDE 120 MG: 120 CAPSULE, COATED, EXTENDED RELEASE ORAL at 09:31

## 2017-01-01 RX ADMIN — CALCIUM CARBONATE-VITAMIN D TAB 500 MG-200 UNIT 1 TABLET: 500-200 TAB at 17:40

## 2017-01-01 RX ADMIN — SODIUM CHLORIDE 1000 ML: 900 INJECTION, SOLUTION INTRAVENOUS at 16:36

## 2017-01-01 RX ADMIN — CALCIUM CARBONATE-VITAMIN D TAB 500 MG-200 UNIT 1 TABLET: 500-200 TAB at 08:34

## 2017-01-01 RX ADMIN — LORAZEPAM 0.5 MG: 2 INJECTION INTRAMUSCULAR at 06:48

## 2017-01-01 RX ADMIN — RIVAROXABAN 15 MG: 15 TABLET, FILM COATED ORAL at 12:29

## 2017-01-01 RX ADMIN — LEVOFLOXACIN 250 MG: 5 INJECTION, SOLUTION INTRAVENOUS at 16:40

## 2017-01-01 RX ADMIN — SODIUM CHLORIDE 100 ML/HR: 900 INJECTION, SOLUTION INTRAVENOUS at 15:28

## 2017-01-01 RX ADMIN — PIPERACILLIN SODIUM,TAZOBACTAM SODIUM 3.38 G: 3; .375 INJECTION, POWDER, FOR SOLUTION INTRAVENOUS at 18:45

## 2017-01-01 RX ADMIN — DILTIAZEM HYDROCHLORIDE 120 MG: 120 CAPSULE, COATED, EXTENDED RELEASE ORAL at 09:01

## 2017-01-01 RX ADMIN — LORAZEPAM 0.5 MG: 2 INJECTION INTRAMUSCULAR at 20:47

## 2017-01-01 RX ADMIN — TRIAMTERENE AND HYDROCHLOROTHIAZIDE 1 TABLET: 37.5; 25 TABLET ORAL at 09:31

## 2017-01-01 RX ADMIN — DEXTROSE MONOHYDRATE AND SODIUM CHLORIDE: 5; .225 INJECTION, SOLUTION INTRAVENOUS at 22:43

## 2017-01-01 RX ADMIN — FAMOTIDINE 10 MG: 20 TABLET ORAL at 07:04

## 2017-01-01 RX ADMIN — LORAZEPAM 0.5 MG: 2 INJECTION INTRAMUSCULAR at 07:45

## 2017-01-01 RX ADMIN — PIPERACILLIN SODIUM,TAZOBACTAM SODIUM 3.38 G: 3; .375 INJECTION, POWDER, FOR SOLUTION INTRAVENOUS at 04:55

## 2017-01-01 RX ADMIN — LEVOTHYROXINE SODIUM 25 MCG: 25 TABLET ORAL at 07:04

## 2017-01-01 RX ADMIN — CALCIUM CARBONATE-VITAMIN D TAB 500 MG-200 UNIT 1 TABLET: 500-200 TAB at 16:40

## 2017-01-01 RX ADMIN — SODIUM CHLORIDE 1000 ML: 900 INJECTION, SOLUTION INTRAVENOUS at 18:12

## 2017-01-01 RX ADMIN — TRIAMTERENE AND HYDROCHLOROTHIAZIDE 1 TABLET: 37.5; 25 TABLET ORAL at 12:18

## 2017-01-01 RX ADMIN — LEVOTHYROXINE SODIUM 25 MCG: 25 TABLET ORAL at 09:00

## 2017-01-01 RX ADMIN — LEVOFLOXACIN 250 MG: 5 INJECTION, SOLUTION INTRAVENOUS at 16:33

## 2017-01-01 RX ADMIN — THERA TABS 1 TABLET: TAB at 09:00

## 2017-01-01 RX ADMIN — FAMOTIDINE 10 MG: 20 TABLET ORAL at 06:47

## 2017-01-01 RX ADMIN — Medication 10 ML: at 22:22

## 2017-01-01 RX ADMIN — LORAZEPAM 0.5 MG: 2 INJECTION INTRAMUSCULAR at 07:39

## 2017-01-01 RX ADMIN — LORAZEPAM 0.5 MG: 2 INJECTION INTRAMUSCULAR at 00:15

## 2017-01-01 RX ADMIN — MORPHINE SULFATE 0.5 MG: 2 INJECTION, SOLUTION INTRAMUSCULAR; INTRAVENOUS at 11:13

## 2017-01-01 RX ADMIN — DILTIAZEM HYDROCHLORIDE 120 MG: 120 CAPSULE, COATED, EXTENDED RELEASE ORAL at 08:35

## 2017-01-01 RX ADMIN — CALCIUM CARBONATE-VITAMIN D TAB 500 MG-200 UNIT 1 TABLET: 500-200 TAB at 09:00

## 2017-01-01 RX ADMIN — MORPHINE SULFATE 0.5 MG: 2 INJECTION, SOLUTION INTRAMUSCULAR; INTRAVENOUS at 11:12

## 2017-01-01 RX ADMIN — MORPHINE SULFATE 0.5 MG: 2 INJECTION, SOLUTION INTRAMUSCULAR; INTRAVENOUS at 00:26

## 2017-01-01 RX ADMIN — SODIUM CHLORIDE 100 ML/HR: 900 INJECTION, SOLUTION INTRAVENOUS at 19:44

## 2017-01-01 RX ADMIN — RIVAROXABAN 15 MG: 15 TABLET, FILM COATED ORAL at 12:18

## 2017-01-01 RX ADMIN — LEVOFLOXACIN 750 MG: 5 INJECTION, SOLUTION INTRAVENOUS at 17:12

## 2017-01-01 RX ADMIN — THERA TABS 1 TABLET: TAB at 08:35

## 2017-01-01 RX ADMIN — Medication 10 ML: at 22:45

## 2017-01-01 RX ADMIN — CALCIUM CARBONATE-VITAMIN D TAB 500 MG-200 UNIT 1 TABLET: 500-200 TAB at 09:31

## 2017-01-01 RX ADMIN — THERA TABS 1 TABLET: TAB at 09:31

## 2017-01-01 RX ADMIN — LEVOTHYROXINE SODIUM 25 MCG: 25 TABLET ORAL at 06:47

## 2017-01-01 RX ADMIN — LORAZEPAM 0.5 MG: 2 INJECTION INTRAMUSCULAR at 15:16

## 2017-01-01 RX ADMIN — LORAZEPAM 0.5 MG: 2 INJECTION INTRAMUSCULAR; INTRAVENOUS at 11:13

## 2017-01-01 RX ADMIN — LORAZEPAM: 2 INJECTION INTRAMUSCULAR at 16:41

## 2017-01-01 RX ADMIN — Medication 10 ML: at 11:59

## 2017-01-26 NOTE — PROGRESS NOTES
HPI:  Yonas Onofre is a 80y.o. year old female who returns to clinic today for routine follow up appointment to discuss the issues below: In Dec she had a fall, was found in the bathroom. This was shortly before I was called by her facility for an episode of confusion which lasted a few days. Since then she has been back to normal without any changes in her medication regimen made. She is doing generally well without new concerns today. She is adherent to her antihypertensive regimen and weekly Fosamax. Her daughter ΦΑΡΜΑΚΑΣ states that without the diuretic her leg swelling worsens. Continues to be followed by cardiology for afib and remains on Xarelto. She has not had any bleeding. Care has transitioned from Dr. Mckayla Reid to Dr. Emily Barajas. Prior to Admission medications    Medication Sig Start Date End Date Taking? Authorizing Provider   triamterene-hydrochlorothiazide (MAXZIDE) 37.5-25 mg per tablet  5/4/16  Yes Historical Provider   alendronate (FOSAMAX) 70 mg tablet Take 1 Tab by mouth every Monday. On an empty stomach. 3/30/15  Yes [facility] Yohan Gray MD   senna-docusate (PERICOLACE) 8.6-50 mg per tablet Take 1 tablet by mouth daily as needed for Constipation. 10/28/14  Yes Debbie Covington MD   diltiazem CD (CARDIZEM CD) 120 mg ER capsule Take 1 capsule by mouth daily. 10/28/14  Yes Debbie Covington MD   rivaroxaban (XARELTO) 15 mg tab tablet Take 1 Tab by mouth daily (with lunch). 8/9/14  Yes Víctor Cruz MD   calcium-vitamin D (CALCIUM 500+D) 500 mg(1,250mg) -200 unit per tablet Take 1 Tab by mouth two (2) times daily (with meals). Yes Historical Provider   multivitamin, stress formula (STRESS TAB) tablet Take 1 Tab by mouth daily. Yes Historical Provider          Allergies   Allergen Reactions    Codeine Other (comments)     Dizziness    Keflex [Cephalexin] Rash           Review of Systems   Constitutional: Negative for chills, fever and malaise/fatigue. HENT: Negative for congestion. Respiratory: Negative for cough, shortness of breath and wheezing. Cardiovascular: Positive for leg swelling. Negative for chest pain and palpitations. Gastrointestinal: Negative for abdominal pain, blood in stool and heartburn. Musculoskeletal: Negative for joint pain and myalgias. Neurological: Negative for dizziness and headaches. Physical Exam   Constitutional: She appears well-nourished. Neck: Carotid bruit is not present. Cardiovascular: Normal rate, regular rhythm and normal heart sounds. No murmur heard. Pulses:       Carotid pulses are 2+ on the right side, and 2+ on the left side. Pulmonary/Chest: Effort normal and breath sounds normal.   Abdominal: Soft. Bowel sounds are normal. There is no hepatosplenomegaly. There is no tenderness. Musculoskeletal: She exhibits no edema. Visit Vitals    /68    Pulse (!) 58    Ht 5' 2.5\" (1.588 m)    Wt 166 lb 9.6 oz (75.6 kg)    SpO2 97%    BMI 29.99 kg/m2         Assessment & Plan:  Vahid Enriquez was seen today for irregular heart beat and osteoporosis. Diagnoses and all orders for this visit:    Osteoporosis  In discussion with her daughter Mallory Chappell we have opted not to pursue bone density testing as the burden of doing the study would outweigh any benefit at this point. Will continue her on Fosamax for another 5 year period ending in 2020. Continue calcium and Vit D supplementation.   -     VITAMIN D, 25 HYDROXY    Essential hypertension  bp low normal.  cardizem has been necessary for rate control in afib and diuretic has been necessary to assist with edema to date. Will continue current regimen for now pending labs. -     CBC W/O DIFF  -     METABOLIC PANEL, COMPREHENSIVE    Paroxysmal atrial fibrillation (HCC)  Continue monitoring with cardiology and medication management. On xarelto.     -     CBC W/O DIFF    Chronic anticoagulation  -     CBC W/O DIFF    Venous insufficiency of both lower extremities  - METABOLIC PANEL, COMPREHENSIVE        Follow-up Disposition:  Return in about 4 months (around 5/26/2017) for follow up with Dr. Jono Sagastume for osteoporosis and afib. Advised her to call back or return to office if symptoms worsen/change/persist.  Discussed expected course/resolution/complications of diagnosis in detail with patient. Medication risks/benefits/costs/interactions/alternatives discussed with patient. She was given an after visit summary which includes diagnoses, current medications, & vitals. She expressed understanding with the diagnosis and plan.

## 2017-01-26 NOTE — MR AVS SNAPSHOT
Visit Information Date & Time Provider Department Dept. Phone Encounter #  
 1/26/2017  2:20 PM Deborah Anthony MD [place] [id number] [facility] [phone] Follow-up Instructions Return in about 4 months (around 5/26/2017) for follow up with Dr. Kandis Belcher for osteoporosis and afib. Upcoming Health Maintenance Date Due DTaP/Tdap/Td series (1 - Tdap) 11/4/1945 INFLUENZA AGE 9 TO ADULT 8/1/2016 MEDICARE YEARLY EXAM 5/27/2017 GLAUCOMA SCREENING Q2Y 6/1/2017 Allergies as of 1/26/2017  Review Complete On: 1/26/2017 By: Delvis De Anda LPN Severity Noted Reaction Type Reactions Codeine  09/08/2011   Intolerance Other (comments) Dizziness Keflex [Cephalexin]  07/27/2015    Rash Current Immunizations  Reviewed on 7/27/2015 Name Date Influenza High Dose Vaccine PF 10/1/2016 Influenza Vaccine 10/15/2015, 10/23/2014, 9/23/2013 Influenza Vaccine Split 10/19/2011 Influenza Vaccine Whole 10/13/2012 Pneumococcal Conjugate (PCV-13) 7/27/2015 Pneumococcal Vaccine (Unspecified Type) 1/1/2006 Not reviewed this visit You Were Diagnosed With   
  
 Codes Comments Osteoporosis    -  Primary ICD-10-CM: M81.0 ICD-9-CM: 733.00 Essential hypertension     ICD-10-CM: I10 
ICD-9-CM: 401.9 Paroxysmal atrial fibrillation (HCC)     ICD-10-CM: I48.0 ICD-9-CM: 427.31 Chronic anticoagulation     ICD-10-CM: Z79.01 
ICD-9-CM: V58.61 Venous insufficiency of both lower extremities     ICD-10-CM: I87.2 ICD-9-CM: 459.81 Vitals BP Pulse Height(growth percentile) Weight(growth percentile) SpO2 BMI  
 116/68 (!) 58 5' 2.5\" (1.588 m) 166 lb 9.6 oz (75.6 kg) 97% 29.99 kg/m2 OB Status Smoking Status Postmenopausal Former Smoker Vitals History BMI and BSA Data Body Mass Index Body Surface Area  
 29.99 kg/m 2 1.83 m 2 Preferred Pharmacy Pharmacy Name Phone [facility] [address] [facility], [address] [place] [address] [phone] Your Updated Medication List  
  
   
This list is accurate as of: 1/26/17  3:29 PM.  Always use your most recent med list.  
  
  
  
  
 alendronate 70 mg tablet Commonly known as:  FOSAMAX Take 1 Tab by mouth every Monday. On an empty stomach. CALCIUM 500+D 500 mg(1,250mg) -200 unit per tablet Generic drug:  calcium-vitamin D Take 1 Tab by mouth two (2) times daily (with meals). dilTIAZem  mg ER capsule Commonly known as:  CARDIZEM CD Take 1 capsule by mouth daily. multivitamin, stress formula tablet Commonly known as:  STRESS TAB Take 1 Tab by mouth daily. rivaroxaban 15 mg Tab tablet Commonly known as:  Caralyn Monica Take 1 Tab by mouth daily (with lunch). senna-docusate 8.6-50 mg per tablet Commonly known as:  Maria Resides Take 1 tablet by mouth daily as needed for Constipation. triamterene-hydroCHLOROthiazide 37.5-25 mg per tablet Commonly known as:  Debborah American We Performed the Following CBC W/O DIFF [02546 CPT(R)] METABOLIC PANEL, COMPREHENSIVE [78518 CPT(R)] VITAMIN D, 25 HYDROXY [id number] CPT(R)] Follow-up Instructions Return in about 4 months (around 5/26/2017) for follow up with Dr. Maria Edwards for osteoporosis and afib. Introducing [facility]! Dear Vahid Enriquez: Thank you for requesting a [employer] account. Our records indicate that you have previously registered for a [employer] account but its currently inactive. Please call our [employer] support line at [phone]. Additional Information If you have questions, please visit the Frequently Asked Questions section of the [employer] website at [email or web address] Remember, [employer] is NOT to be used for urgent needs. For medical emergencies, dial 911. Now available from your iPhone and Android! Please provide this summary of care documentation to your next provider. Your primary care clinician is listed as Chintan Mayen. If you have any questions after today's visit, please call [phone].

## 2017-01-26 NOTE — PROGRESS NOTES
Michelle Stearns is a 80 y.o. female  Chief Complaint   Patient presents with    Irregular Heart Beat    Osteoporosis     1. Have you been to the ER, urgent care clinic since your last visit? Hospitalized since your last visit? No    2. Have you seen or consulted any other health care providers outside of the [address] [place] [address] since your last visit? Include any pap smears or colon screening. Dr. America Bond, cardiology, 11/2016.

## 2017-01-28 PROBLEM — G31.84 MILD COGNITIVE IMPAIRMENT WITH MEMORY LOSS: Status: ACTIVE | Noted: 2017-01-01

## 2017-03-15 NOTE — TELEPHONE ENCOUNTER
I reviewed with pt's daughter Yariel Holcomb. States Yunior Kolb was not suddenly more confused today, not at baseline. Incontinent at baseline, no fevers, chills, GI sx or c/o pain. Plan: Will ask [facility] to send a urinalysis and urine culture. Yariel Holcomb (an RN) will continue to monitor Mom and bring her to ER if necessary.

## 2017-03-15 NOTE — TELEPHONE ENCOUNTER
Patient's daughter called stating that she went to visit the patient and noted that she was very confused. The assisted living also sent  a fax about this today. Patient's daughter wanted to know if  could order a urinalysis for the patient.

## 2017-03-17 PROBLEM — R41.0 ACUTE DELIRIUM: Status: ACTIVE | Noted: 2017-01-01

## 2017-03-17 NOTE — ED PROVIDER NOTES
HPI Comments: 80 y.o. female with past medical history significant for HTN, osteoporosis, GERD, A-fib, and dementia who presents from [facility] via EMS with chief complaint of AMS. Pt's daughter is present and states that she saw the pt 2 days ago. At that point, the pt was noted to be more lethargic than normal (walking slowly), weaker, and was speaking slowly. A urine culture was taken at that time and had mild growth that was believed to be contamination of the sample. Pt has had similar presentation in the past which resolved on its own. Daughter was called this morning and told that the pt was being increasingly combative and confused. EMS reports that pt's initial room air O2 sats were 80%. 100% on 10L. Daughter reports that the pt has some memory loss and dementia at baseline. Pt is normally able to ambulate with the assistance of a walker and get herself to meals on her own. Pt is on Xarelto for h/o A-fib. Daughter states that the pt's current leg swelling is at baseline. Daughter denies that the pt has had any of the following: fever, chills, cough, rhinorrhea, and any urinary or bowel changes. There are no other acute medical concerns at this time. Social hx: Former smoker. No alcohol use. PCP: [address], [place]    Full history, physical exam, and ROS limited due to:  Dementia and AMS. Family is able to answer a number of questions. Note written by Kali Irizarry, as dictated by Sonia Chen MD 11:13 AM    The history is provided by a relative. The history is limited by the condition of the patient (AMS).         Past Medical History:   Diagnosis Date    A-fib ([address]     Dementia     GERD (gastroesophageal reflux disease)     Hypertension     Osteoporosis        Past Surgical History:   Procedure Laterality Date    HX HEENT      Tonsillectomy    HX MALIGNANT SKIN LESION EXCISION  2015    basal cell left forehead - Sandeep Moffett    HX ORTHOPAEDIC  2007    R TKR - Dr. Sonia Jaramillo HX ORTHOPAEDIC  2004    L TKR    HX ORTHOPAEDIC  2014    partial left hip replacement         Family History:   Problem Relation Age of Onset    Dementia Mother     Heart Disease Father     Lung Disease Brother     No Known Problems Daughter     No Known Problems Son     No Known Problems Son        Social History     Social History    Marital status:      Spouse name: N/A    Number of children: N/A    Years of education: N/A     Occupational History    Not on file. Social History Main Topics    Smoking status: Former Smoker     Quit date: 1/15/1964    Smokeless tobacco: Never Used    Alcohol use No    Drug use: No    Sexual activity: No     Other Topics Concern    Not on file     Social History Narrative         ALLERGIES: Codeine and Keflex [cephalexin]    Review of Systems   Unable to perform ROS: Mental status change (Limited but family is able to answer a number of questions)   Constitutional: Negative for chills and fever. HENT: Negative for rhinorrhea and sore throat. Respiratory: Negative for cough. Gastrointestinal: Negative for constipation and diarrhea. Genitourinary: Negative for frequency. Neurological: Positive for weakness. Psychiatric/Behavioral: Positive for agitation and confusion. Vitals:    03/17/17 1122 03/17/17 1144   BP: 127/59 126/65   Pulse:  64   Temp:  (!) 93.6 °F (34.2 °C)   SpO2: 94% 96%   Weight: 74.8 kg (165 lb)    Height: 5' 3\" (1.6 m)             Physical Exam     Const:  No acute distress, well developed, well nourished. Keeps trying to pull off nonrebreathing mask. Head:  Atraumatic, normocephalic  Eyes:  PERRL, conjunctiva normal, no scleral icterus  Neck:  Supple, trachea midline  Cardiovascular:  RRR, no murmurs, no gallops, no rubs  Resp:  No resp distress, no increased work of breathing, no wheezes, no rhonchi. Mild rales right base.    Abd:  Soft, non-tender, non-distended, no rebound, no guarding, no CVA tenderness  :  Deferred  MSK:  No pedal edema, normal ROM  Neuro:  Awake and alert, no cranial nerve defect  Skin:  Warm, dry, intact  Psych: normal mood and affect, behavior is normal, judgement and thought content is normal    Note written by Kali Dyson, as dictated by Valentina Peters MD 11:15 PM      MDM  Number of Diagnoses or Management Options  Acute cystitis without hematuria:   Acute encephalopathy:      Amount and/or Complexity of Data Reviewed  Clinical lab tests: ordered and reviewed  Tests in the radiology section of CPT®: ordered and reviewed  Review and summarize past medical records: yes    Critical Care  Total time providing critical care: 30-74 minutes (35 min.)    Patient Progress  Patient progress: stable    ED Course     Pt. Presents to the ER with AMS and hypothermia (temp of 93). Pt. Found to have a UTI. We are warming her. I have started her on levofloxacin. Pt. To be evaluated for admission by the hospitalist.      Procedures      CONSULT NOTE:  3:49 PM Valentina Peters MD spoke with Dr. Jerry Antonio, Consult for Hospitalist.  Discussed available diagnostic tests and clinical findings. He is in agreement with care plans as outlined. Dr. Jerry Antonio will see and admit the pt. EKG interpretation: (Preliminary)  Rhythm: sinus rhythm; and regular . Rate (approx.): 64; Axis: left axis deviation; P wave: normal; QRS interval: normal ; ST/T wave: normal; Other findings: borderline ekg.

## 2017-03-17 NOTE — ED TRIAGE NOTES
Pt arrives from [facility] assisted living with c/o AMS. Per daughter, pt has had increased confusion since Tuesday. Pt with h/o dementia. Today, pt was unable to get out of the bed independently. Per EMS RA sat 80%. Glucose 73  Pt was combative with EMS.

## 2017-03-17 NOTE — IP AVS SNAPSHOT
[address] 
[phone] Patient: Lauro Campos MRN: [id number] :1924 You are allergic to the following Allergen Reactions Codeine Other (comments) Dizziness Keflex (Cephalexin) Rash Recent Documentation Height Weight BMI OB Status Smoking Status 1.6 m 75.9 kg 29.64 kg/m2 Postmenopausal Former Smoker Unresulted Labs Order Current Status CULTURE, BLOOD, PAIRED Preliminary result Emergency Contacts Name Discharge Info Relation Home Work Mobile [address] CAREGIVER [3] Child [2] [phone] [facility] 9406  Child [2] [phone] About your hospitalization You were admitted on:  2017 You last received care in the:  [facility] 4 IM 2 You were discharged on:  2017 Unit phone number:  [phone] Why you were hospitalized Your primary diagnosis was:  Acute Delirium Your diagnoses also included:  Uti (Urinary Tract Infection) Providers Seen During Your Hospitalizations Provider Role Specialty Primary office phone Valentina Peters MD Attending Provider Emergency Medicine [phone] Juany Moore MD Attending Provider Internal Medicine [phone] Talita Johnson MD Attending Provider Internal Medicine [phone] Vinayak Guajardo MD Attending Provider Internal Medicine [phone] Your Primary Care Physician (PCP) Primary Care Physician Office Phone Office Fax Coronaashley Arnulfo [phone] Follow-up Information Follow up With Details Comments Contact Info Kingsley Lopez MD Schedule an appointment as soon as possible for a visit in 1 week post-hospital follow-up;  you should have your thyroid function tests rechecked in 6 weeks [address] [facility] [address] [place] [address] 
[phone] Current Discharge Medication List  
  
START taking these medications Dose & Instructions Dispensing Information Comments Morning Noon Evening Bedtime  
 levoFLOXacin 250 mg tablet Commonly known as:  Zainab Archer Your last dose was: Your next dose is:    
   
   
 Dose:  250 mg Take 1 Tab by mouth daily for 3 days. Quantity:  3 Tab Refills:  0  
     
   
   
   
  
 levothyroxine 25 mcg tablet Commonly known as:  SYNTHROID Your last dose was: Your next dose is:    
   
   
 Dose:  25 mcg Take 1 Tab by mouth Daily (before breakfast). Quantity:  30 Tab Refills:  0 CONTINUE these medications which have NOT CHANGED Dose & Instructions Dispensing Information Comments Morning Noon Evening Bedtime  
 acetaminophen 325 mg tablet Commonly known as:  TYLENOL Your last dose was: Your next dose is:    
   
   
 Dose:  650 mg Take 650 mg by mouth every six (6) hours as needed for Pain. Refills:  0  
     
   
   
   
  
 CALCIUM 500+D 500 mg(1,250mg) -200 unit per tablet Generic drug:  calcium-vitamin D Your last dose was: Your next dose is:    
   
   
 Dose:  1 Tab Take 1 Tab by mouth two (2) times daily (with meals). Refills:  0 CARDIZEM  mg ER capsule Generic drug:  dilTIAZem CD Your last dose was: Your next dose is:    
   
   
 Dose:  120 mg Take 120 mg by mouth daily. Refills:  0  
     
   
   
   
  
 FOSAMAX 70 mg tablet Generic drug:  alendronate Your last dose was: Your next dose is:    
   
   
 Dose:  70 mg Take 70 mg by mouth every Monday. Refills:  0  
     
   
   
   
  
 loperamide 2 mg capsule Commonly known as:  IMODIUM Your last dose was: Your next dose is:    
   
   
 Dose:  2 mg Take 2 mg by mouth every eight (8) hours as needed for Diarrhea. Refills:  0 senna-docusate 8.6-50 mg per tablet Commonly known as:  Gaurav Owens Your last dose was: Your next dose is:    
   
   
 Dose:  1 Tab Take 1 Tab by mouth daily as needed for Constipation. Refills:  0  
     
   
   
   
  
 THERA tablet Generic drug:  therapeutic multivitamin Your last dose was: Your next dose is:    
   
   
 Dose:  1 Tab Take 1 Tab by mouth daily. Refills:  0  
     
   
   
   
  
 triamterene-hydroCHLOROthiazide 37.5-25 mg per tablet Commonly known as:  Juliano Valle Your last dose was: Your next dose is:    
   
   
 Dose:  1 Tab Take 1 Tab by mouth daily. Refills:  0 XARELTO 15 mg Tab tablet Generic drug:  rivaroxaban Your last dose was: Your next dose is:    
   
   
 Dose:  15 mg Take 15 mg by mouth daily (with lunch). Refills:  0  
     
   
   
   
  
 ZANTAC 75 75 mg tablet Generic drug:  raNITIdine Your last dose was: Your next dose is:    
   
   
 Dose:  75 mg Take 75 mg by mouth Daily (before breakfast). Refills:  0 Where to Get Your Medications These medications were sent to [address], [address] [place] [address], [address] [place] [address] Phone:  [phone]  
  levoFLOXacin 250 mg tablet  
 levothyroxine 25 mcg tablet Discharge Instructions Discharge Instructions PATIENT ID: Michelle Stearns MRN: [id number] YOB: 1924 DATE OF ADMISSION: 3/17/2017 11:00 AM   
DATE OF DISCHARGE: 3/20/2017 PRIMARY CARE PROVIDER: [address], [place]  
 
ATTENDING PHYSICIAN: Jason Cash MD 
DISCHARGING PROVIDER: Jason Cash MD   
To contact this individual call [phone] and ask the  to page. If unavailable ask to be transferred the Adult Hospitalist Department. DISCHARGE DIAGNOSES Acute delirium E. Coli urinary tract infection Dehydration Hypertension Dementia Subclinical hypothyroidism Atrial fibrillation CONSULTATIONS: IP CONSULT TO HOSPITALIST 
 
PROCEDURES/SURGERIES: * No surgery found * PENDING TEST RESULTS:  
At the time of discharge the following test results are still pending:  
  None. FOLLOW UP APPOINTMENTS:  
Follow-up Information Follow up With Details Comments Contact Info [address], [place] Schedule an appointment as soon as possible for a visit in 1 week post-hospital follow-up;  you should have your thyroid function tests rechecked in 6 weeks [address] [facility] [address] 
[phone] ADDITIONAL CARE RECOMMENDATIONS:  
  You were started on 2 new medications during this hospitalization as follows: 
 (1) Levofloxacin or Levaquin, which is an antibiotic used to treat your urinary tract infection. You will take this for an additional 3 days to complete course of antibiotics. The most common side effect is diarrhea, which are liquid bowel movements. If you have more than 3 episodes of diarrhea in a day or diarrhea with fevers, please either call Dr. Tanvir Estes office or come to the hospital for re-evaluation. (2) Levothyroxine or Synthroid, which is a thyroid medication used to treat subclinical hypothyroidism. You should have your thyroid function re-tested in about 4-6 weeks with your primary care provider. DIET: Regular Diet ACTIVITY: PT/OT per Home Health 
 
WOUND CARE: N/A 
 
EQUIPMENT needed: N/A 
 
 
  
 SNF/Inpatient Rehab/LTAC  
x Independent/assisted living Hospice Other: CDMP Checked:  
Yes x PROBLEM LIST Updated: 
Yes x Signed:  
Geannie Gitelman, MD 
3/20/2017 1:35 PM 
 
 
 
  
Urinary Tract Infection in Women: Care Instructions Your Care Instructions A urinary tract infection, or UTI, is a general term for an infection anywhere between the kidneys and the urethra (where urine comes out). Most UTIs are bladder infections. They often cause pain or burning when you urinate. UTIs are caused by bacteria and can be cured with antibiotics. Be sure to complete your treatment so that the infection goes away. Follow-up care is a key part of your treatment and safety. Be sure to make and go to all appointments, and call your doctor if you are having problems. It's also a good idea to know your test results and keep a list of the medicines you take. How can you care for yourself at home? · Take your antibiotics as directed. Do not stop taking them just because you feel better. You need to take the full course of antibiotics. · Drink extra water and other fluids for the next day or two. This may help wash out the bacteria that are causing the infection. (If you have kidney, heart, or liver disease and have to limit fluids, talk with your doctor before you increase your fluid intake.) · Avoid drinks that are carbonated or have caffeine. They can irritate the bladder. · Urinate often. Try to empty your bladder each time.  
· To relieve pain, take a hot bath or lay a heating pad set on low over your lower belly or genital area. Never go to sleep with a heating pad in place. To prevent UTIs · Drink plenty of water each day. This helps you urinate often, which clears bacteria from your system. (If you have kidney, heart, or liver disease and have to limit fluids, talk with your doctor before you increase your fluid intake.) · Consider adding cranberry juice to your diet. · Urinate when you need to. · Urinate right after you have sex. · Change sanitary pads often. · Avoid douches, bubble baths, feminine hygiene sprays, and other feminine hygiene products that have deodorants. · After going to the bathroom, wipe from front to back. When should you call for help? Call your doctor now or seek immediate medical care if: · Symptoms such as fever, chills, nausea, or vomiting get worse or appear for the first time. · You have new pain in your back just below your rib cage. This is called flank pain. · There is new blood or pus in your urine. · You have any problems with your antibiotic medicine. Watch closely for changes in your health, and be sure to contact your doctor if: 
· You are not getting better after taking an antibiotic for 2 days. · Your symptoms go away but then come back. Where can you learn more? Go to http://adama-marcio.info/. Enter Q480 in the search box to learn more about \"Urinary Tract Infection in Women: Care Instructions. \" Current as of: August 12, 2016 Content Version: 11.1 © 5257-1559 [employer]. Care instructions adapted under license by [employer] (which disclaims liability or warranty for this information). If you have questions about a medical condition or this instruction, always ask your healthcare professional. [place] [address] any warranty or liability for your use of this information. Discharge Orders None ACO Transitions of Care Introducing [employer] [address] Marianne Mayes offers a voluntary care coordination program to provide high quality service and care to [facility] fee-for-service beneficiaries. Jeff Carrillo was designed to help you enhance your health and well-being through the following services: ? Transitions of Care  support for individuals who are transitioning from one care setting to another (example: Hospital to home). ? Chronic and Complex Care Coordination  support for individuals and caregivers of those with serious or chronic illnesses or with more than one chronic (ongoing) condition and those who take a number of different medications. If you meet specific medical criteria, a [address] [place] [address] Rd may call you directly to coordinate your care with your primary care physician and your other care providers. For questions about the [facility] programs, please, contact your physicians office. For general questions or additional information about Accountable Care Organizations: 
Please visit www.medicare.gov/acos. html or call 1-800-MEDICARE (3-690.487.4895) TTY users should call 2-692.694.5139. Qool Announcement We are excited to announce that we are making your provider's discharge notes available to you in Qool. You will see these notes when they are completed and signed by the physician that discharged you from your recent hospital stay. If you have any questions or concerns about any information you see in Qool, please call the Health Information Department where you were seen or reach out to your Primary Care Provider for more information about your plan of care. Introducing [facility]! [facility] introduces Qool patient portal. Now you can access parts of your medical record, email your doctor's office, and request medication refills online.    
 
1. In your internet browser, go to [email or web address] 2. Click on the First Time User? Click Here link in the Sign In box. You will see the New Member Sign Up page. 3. Enter your [employer] Access Code exactly as it appears below. You will not need to use this code after youve completed the sign-up process. If you do not sign up before the expiration date, you must request a new code. · [employer] Access Code: [email or web address] Expires: 6/15/2017 12:05 PM 
 
4. Enter the last four digits of your Social Security Number (xxxx) and Date of Birth (mm/dd/yyyy) as indicated and click Submit. You will be taken to the next sign-up page. 5. Create a [employer] ID. This will be your [employer] login ID and cannot be changed, so think of one that is secure and easy to remember. 6. Create a [employer] password. You can change your password at any time. 7. Enter your Password Reset Question and Answer. This can be used at a later time if you forget your password. 8. Enter your e-mail address. You will receive e-mail notification when new information is available in [address] 9. Click Sign Up. You can now view and download portions of your medical record. 10. Click the Download Summary menu link to download a portable copy of your medical information. If you have questions, please visit the Frequently Asked Questions section of the [employer] website. Remember, [employer] is NOT to be used for urgent needs. For medical emergencies, dial 911. Now available from your iPhone and Android! General Information Please provide this summary of care documentation to your next provider. Patient Signature:  ____________________________________________________________ Date:  ____________________________________________________________  
  
Mk Bougie Provider Signature:  ____________________________________________________________ Date:  ____________________________________________________________

## 2017-03-17 NOTE — ROUTINE PROCESS
TRANSFER - OUT REPORT:    Verbal report given to HEBER Coleman(name) on Ricky Novoa  being transferred to [facility]) for routine progression of care       Report consisted of patients Situation, Background, Assessment and   Recommendations(SBAR). Information from the following report(s) SBAR, ED Summary, [facility] and Recent Results was reviewed with the receiving nurse. Lines:   Peripheral IV 03/17/17 Left Antecubital (Active)   Site Assessment Clean, dry, & intact 3/17/2017 11:59 AM   Phlebitis Assessment 0 3/17/2017 11:59 AM   Infiltration Assessment 0 3/17/2017 11:59 AM   Dressing Status Clean, dry, & intact 3/17/2017 11:59 AM   Dressing Type Transparent; Other (comments) 3/17/2017 11:59 AM   Hub Color/Line Status Pink;Flushed;Capped 3/17/2017 11:59 AM   Action Taken Blood drawn 3/17/2017 11:59 AM       Peripheral IV 03/17/17 Right Antecubital (Active)   Site Assessment Clean, dry, & intact 3/17/2017 12:08 PM   Phlebitis Assessment 0 3/17/2017 12:08 PM   Infiltration Assessment 0 3/17/2017 12:08 PM   Dressing Status Clean, dry, & intact 3/17/2017 12:08 PM   Dressing Type Transparent 3/17/2017 12:08 PM   Hub Color/Line Status Patent; Flushed;Capped;Pink 3/17/2017 12:08 PM   Action Taken Blood drawn 3/17/2017 12:08 PM        Opportunity for questions and clarification was provided.

## 2017-03-17 NOTE — H&P
[address]   [address]   HISTORY AND PHYSICAL       Name:  Deedee Messer   MR#:  [id number]   :  1924   Account #:  [de-identified]        Date of Adm:  2017       PRIMARY CARE PHYSICIAN: Anisha Husain. MD Lior    SOURCE OF INFORMATION: The patient's daughter, who was   present at the bedside. CHIEF COMPLAINT: Change in mental status. HISTORY OF PRESENT ILLNESS: This is a 77-year-old woman with   a past medical history significant for dementia, chronic atrial fibrillation,   on Xarelto and osteoporosis, who was in her usual state of health until   about 2 days ago, when the patient developed change in mental status   at the nursing home where the patient resides. It was also reported   that the patient developed increased weakness and lethargy. Because   of the suspicion for a urinary tract infection, urinalysis was obtained at   the nursing home yesterday. The result is not known. She was sent to   the emergency room today because of her worsening symptoms. When the patient arrived at the emergency room, CT scan of the head   was obtained. The CT scan was negative for acute pathology. Her   urinalysis was consistent with a urinary tract infection. The patient was   started on antibiotics, and she was referred to the hospitalist service   for evaluation for admission. No history of fever, no rigors and no chills. PAST MEDICAL HISTORY: Dementia, hypertension and atrial   fibrillation, on Xarelto. ALLERGIES: THE PATIENT IS ALLERGIC TO   1. CODEINE. 2. Gearold Spry. MEDICATIONS   1. Tylenol 650 mg every 6 hours as needed for pain. 2. Fosamax 70 mg every week on Monday. 3. Cardizem  mg daily. 4. Imodium 2 mg every 8 hours as needed for diarrhea. 5. Zantac 75 mg daily. 6. Xarelto 15 mg daily. 7. Multivitamin 1 tablet daily. 8. Maxzide 37.5/25 one tablet daily. FAMILY HISTORY: This was reviewed. Mother had dementia.  Father had heart disease. PAST SURGICAL HISTORY: This is significant for right total knee   replacement, left total knee replacement and partial left hip   replacement. SOCIAL HISTORY: The patient is a former smoker, quit tobacco abuse   in January of 1964. No history of alcohol abuse. REVIEW OF SYSTEMS: Unable to obtain because of the patient's   mental status. PHYSICAL EXAMINATION   GENERAL APPEARANCE: The patient appeared ill, in moderate   distress. VITAL SIGNS: On arrival at the emergency room, temperature 93.6,   pulse 64, blood pressure 127/59, oxygen saturation 94% on room air. HEAD: Normocephalic, atraumatic. EYES: Normal eye movements. No redness, no drainage,   no discharge. EARS: Normal external ears, with no obvious drainage. NOSE: No deformity, no drainage. MOUTH, AND THROAT: No visible oral lesions. Dry oral mucosa   noted. NECK: Supple. No JVD. No thyromegaly. CHEST: Clear breath sounds. No wheezing, no crackles. HEART: Normal S1 and S2. Irregularly irregular. No clinically   appreciable murmur. ABDOMEN: Soft, nontender, normal bowel sounds. CENTRAL NERVOUS SYSTEM: Lethargic, but arousable. EXTREMITIES: No edema. Pulses 2+ bilaterally. MUSCULOSKELETAL: No obvious joint deformity or swelling. PSYCHIATRIC: Normal mood, but flat affect. SKIN: No active skin lesions seen in the exposed parts of the body. LYMPHATICS:  No cervical lymphadenopathy. DIAGNOSTIC DATA: EKG shows sinus rhythm, and nonspecific ST   and T-wave abnormalities. Chest x-ray, no acute pathology. CT scan   of the head without contrast negative. LABORATORY DATA: Urinalysis: This is significant for positive nitrite,   moderate leukocyte esterase and 4+ bacteria. TSH level 7.04. Cardiac   profile: Troponin less than 0.04, CK-MB 18.2.  Chemistry: Sodium 144,   potassium 4.5, chloride 108, CO2 28, glucose 78, BUN 38, creatinine   1.10, calcium 10.5, bilirubin total 0.5, ALT 48, AST 46, alkaline phosphatase 120, total protein 7.4, albumin level 3.1, globulin 4.3. Lactic acid level 0.8. Hematology: WBC 6.5, hemoglobin 11.8,   hematocrit 36.2, platelets at 205. ASSESSMENT   1. Acute delirium. 2. Acute cystitis, without hematuria. 3. Hypertension. 4. Dementia. 5. Dehydration. 6. Hypercalcemia. 7. Atrial fibrillation. 8. Hypothermia. 9. Subclinical hypothyroidism. PLAN   1. Acute delirium. Will admit the patient for further evaluation. This is   most likely as a result of the acute cystitis. Will identify and treat the   underlying factors. It could also be due to dehydration, which will be   discussed below. Will monitor the patient clinically. 2. Acute cystitis, without hematuria. Will start the patient on Levaquin. This is the most likely the cause of the patient's acute delirium. Will   await a urine culture. 3. Hypertension. Will resume her home medications, and monitor the   patient's blood pressure closely. 4. Dementia. Will continue with supportive therapy. 5. Dehydration. This is also contributing to the acute delirium. Will carry   out hydration with normal saline. 6. Hypercalcemia. Will carry out hydration with normal saline and will   repeat a calcium level. 7. Atrial fibrillation. Will continue with the Xarelto for anticoagulation. The patient appears to be in normal sinus rhythm at present. 8. Hypothermia. This could also be contributing to the patient's acute   delirium. Will continue with the rewarming process started in the   emergency room. 9. Subclinical hypothyroidism. Will start the patient on low-dose   Synthroid. The patient will require repeat a TSH level in a couple of   weeks. OTHER ISSUES   1. CODE STATUS: THE PATIENT IS A NO CODE. 2. The patient is already on Xarelto. Because of that, there is no need   for DVT prophylaxis with Lovenox.          Frank George MD       / [facility]   D:  03/17/2017   16:52   T:  03/17/2017   17:35   Job #: [id number]

## 2017-03-17 NOTE — ED NOTES
Upon transfer to [facility], patient is resting on stretcher, appears in no acute distress, respirations equal and unlabored, VS WNL.

## 2017-03-17 NOTE — PROGRESS NOTES
Admission Medication Reconciliation:    Information obtained from: external medical records    Significant PMH/Disease States:   Past Medical History:   Diagnosis Date    A-fib ([address]     Dementia     GERD (gastroesophageal reflux disease)     Hypertension     Osteoporosis        Chief Complaint for this Admission:  AMS    Allergies:  Codeine and Keflex [cephalexin]    Prior to Admission Medications:   Prior to Admission Medications   Prescriptions Last Dose Informant Patient Reported? Taking?   acetaminophen (TYLENOL) 325 mg tablet   Yes Yes   Sig: Take 650 mg by mouth every six (6) hours as needed for Pain. alendronate (FOSAMAX) 70 mg tablet   Yes Yes   Sig: Take 70 mg by mouth every Monday. calcium-vitamin D (CALCIUM 500+D) 500 mg(1,250mg) -200 unit per tablet 3/17/2017 at am  Yes Yes   Sig: Take 1 Tab by mouth two (2) times daily (with meals). dilTIAZem CD (CARDIZEM CD) 120 mg ER capsule 3/17/2017 at am  Yes Yes   Sig: Take 120 mg by mouth daily. loperamide (IMODIUM) 2 mg capsule   Yes Yes   Sig: Take 2 mg by mouth every eight (8) hours as needed for Diarrhea. raNITIdine (ZANTAC 75) 75 mg tablet 3/17/2017 at am  Yes Yes   Sig: Take 75 mg by mouth Daily (before breakfast). rivaroxaban (XARELTO) 15 mg tab tablet 3/17/2017 at pm  Yes Yes   Sig: Take 15 mg by mouth daily (with lunch). senna-docusate (PERICOLACE) 8.6-50 mg per tablet   Yes Yes   Sig: Take 1 Tab by mouth daily as needed for Constipation. therapeutic multivitamin (THERA) tablet 3/17/2017 at am  Yes Yes   Sig: Take 1 Tab by mouth daily. triamterene-hydrochlorothiazide (MAXZIDE) 37.5-25 mg per tablet 3/17/2017 at am  Yes Yes   Sig: Take 1 Tab by mouth daily. Facility-Administered Medications: None         Comments/Recommendations:  PTA medication list updated with information provided by Andrew at [facility]) dated March 2017. Inpatient orders have been reviewed and no changes are needed.     Thank you for allowing me to participate in the care of this patient. Please contact the pharmacy at  or the medication reconciliation pharmacist at  with any questions. Violetta Llanos Pharm. D., BCPS, BCPPS

## 2017-03-18 NOTE — PROGRESS NOTES
Hospitalist Progress Note         Keila Tubbs MD     Call physician on-call through the  7pm-7am    Daily Progress Note: 3/18/2017    Admission summary and hospital course   59-year-old woman with   a past medical history significant for dementia, chronic atrial fibrillation,   on Xarelto and osteoporosis, who was in her usual state of health until   about 2 days ago, when the patient developed change in mental status   at the nursing home where the patient resides    Assessment/Plan: 1. Acute delirium/ metabolic encephalopathy :  Could be because of UTI . Treat same , CT head normal , also subclinical hypothyroidism may be responsible , see TSH and free T4 results   2. Acute cystitis, without hematuria. Will start the patient on Levaquin. await a urine culture. 3. Hypertension. home medications, and monitor the   patient's blood pressure closely. 4. Dementia. supportive therapy. 5. Dehydration. hydration with normal saline. 6. Hypercalcemia. Will carry out hydration with normal saline , likly due to dehydration , corrected today is 11   7. Atrial fibrillation. Xarelto for anticoagulation. 8. Hypothermia. rewarming process started in the   emergency room. now off , again will heck cortisol , hypothyroidism could contribute    9. Subclinical hypothyroidism. Will start the patient on low-dose   Synthroid. T3 is normal . Will treat even if TSH not above 10 because of altered mental status and hypothermia       VTE prophylaxis: SCD/lovenox  Discussed plan of care with Patient/Family and Nurse   Pre-admission lived at   Discharge planning:PT/OT          Subjective: The patient is without any acute complaints at this time.     Discussed in details about plan of treatment with the daughter    Review of Systems:   A comprehensive review of systems was negative except mentioned in A/P    Objective:   Physical Exam:     Visit Vitals    /84 (BP 1 Location: Left arm, BP Patient Position: At rest)    Pulse 72    Temp 97.2 °F (36.2 °C)    Resp 15    Ht 5' 3\" (1.6 m)    Wt 70.9 kg (156 lb 4.9 oz)    SpO2 94%    BMI 27.69 kg/m2      O2 Device: Room air    Temp (24hrs), Av.2 °F (36.2 °C), Min:95 °F (35 °C), Max:99.4 °F (37.4 °C)    701 - 1900  In: 145 [P.O.:60; I.V.:800]  Out: -    1901 -  0700  In: 926.7 [I.V.:926.7]  Out: -     General:  Alert, cooperative, no distress, appears stated age. Lungs:   Clear to auscultation bilaterally. Chest wall:  No tenderness or deformity. Heart:  Regular rate and rhythm, S1, S2 normal, no murmur, click, rub or gallop. Abdomen:   Soft, non-tender. Bowel sounds normal. No masses,  No organomegaly. Extremities: Extremities normal, atraumatic, no cyanosis or edema. Pulses: 2+ and symmetric all extremities. Skin: Skin color, texture, turgor normal. No rashes or lesions   Neurologic: CNII-XII intact. Data Review:       Recent Days:  Recent Labs      17   0427  17   1212   WBC  5.5  6.5   HGB  10.8*  11.8   HCT  33.4*  36.2   PLT  203  213     Recent Labs      17   0427  17   1212   NA  144  144   K  4.6  4.5   CL  109*  108   CO2  25  28   GLU  67  78   BUN  30*  38*   CREA  1.04*  1.10*   CA  9.9  10.5*   MG  1.7   --    PHOS  2.1*   --    ALB  2.6*  3.1*   SGOT  41*  46*   ALT  42  48     No results for input(s): PH, PCO2, PO2, HCO3, FIO2 in the last 72 hours.     24 Hour Results:  Recent Results (from the past 24 hour(s))   GLUCOSE, POC    Collection Time: 17  6:29 PM   Result Value Ref Range    Glucose (POC) 92 65 - 100 mg/dL    Performed by Chris Lopez    TROPONIN I    Collection Time: 17  7:45 PM   Result Value Ref Range    Troponin-I, Qt. <0.04 <0.05 ng/mL   CK W/ CKMB & INDEX    Collection Time: 17  7:45 PM   Result Value Ref Range     (H) 26 - 192 U/L    CK - MB 14.8 (H) <3.6 NG/ML    CK-MB Index 5.5 (H) 0 - 2.5     METABOLIC PANEL, COMPREHENSIVE    Collection Time: 03/18/17  4:27 AM   Result Value Ref Range    Sodium 144 136 - 145 mmol/L    Potassium 4.6 3.5 - 5.1 mmol/L    Chloride 109 (H) 97 - 108 mmol/L    CO2 25 21 - 32 mmol/L    Anion gap 10 5 - 15 mmol/L    Glucose 67 65 - 100 mg/dL    BUN 30 (H) 6 - 20 MG/DL    Creatinine 1.04 (H) 0.55 - 1.02 MG/DL    BUN/Creatinine ratio 29 (H) 12 - 20      GFR est AA >60 >60 ml/min/1.73m2    GFR est non-AA 50 (L) >60 ml/min/1.73m2    Calcium 9.9 8.5 - 10.1 MG/DL    Bilirubin, total 0.5 0.2 - 1.0 MG/DL    ALT (SGPT) 42 12 - 78 U/L    AST (SGOT) 41 (H) 15 - 37 U/L    Alk. phosphatase 102 45 - 117 U/L    Protein, total 6.5 6.4 - 8.2 g/dL    Albumin 2.6 (L) 3.5 - 5.0 g/dL    Globulin 3.9 2.0 - 4.0 g/dL    A-G Ratio 0.7 (L) 1.1 - 2.2     CBC WITH AUTOMATED DIFF    Collection Time: 03/18/17  4:27 AM   Result Value Ref Range    WBC 5.5 3.6 - 11.0 K/uL    RBC 3.91 3.80 - 5.20 M/uL    HGB 10.8 (L) 11.5 - 16.0 g/dL    HCT 33.4 (L) 35.0 - 47.0 %    MCV 85.4 80.0 - 99.0 FL    MCH 27.6 26.0 - 34.0 PG    MCHC 32.3 30.0 - 36.5 g/dL    RDW 16.9 (H) 11.5 - 14.5 %    PLATELET 279 453 - 307 K/uL    NEUTROPHILS 70 32 - 75 %    LYMPHOCYTES 21 12 - 49 %    MONOCYTES 8 5 - 13 %    EOSINOPHILS 1 0 - 7 %    BASOPHILS 0 0 - 1 %    ABS. NEUTROPHILS 3.8 1.8 - 8.0 K/UL    ABS. LYMPHOCYTES 1.1 0.8 - 3.5 K/UL    ABS. MONOCYTES 0.5 0.0 - 1.0 K/UL    ABS. EOSINOPHILS 0.1 0.0 - 0.4 K/UL    ABS.  BASOPHILS 0.0 0.0 - 0.1 K/UL   TROPONIN I    Collection Time: 03/18/17  4:27 AM   Result Value Ref Range    Troponin-I, Qt. <0.04 <0.05 ng/mL   PHOSPHORUS    Collection Time: 03/18/17  4:27 AM   Result Value Ref Range    Phosphorus 2.1 (L) 2.6 - 4.7 MG/DL   CK W/ CKMB & INDEX    Collection Time: 03/18/17  4:27 AM   Result Value Ref Range     (H) 26 - 192 U/L    CK - MB 9.1 (H) <3.6 NG/ML    CK-MB Index 4.7 (H) 0 - 2.5     TSH 3RD GENERATION    Collection Time: 03/18/17  4:27 AM   Result Value Ref Range    TSH 6.29 (H) 0.36 - 3.74 uIU/mL MAGNESIUM    Collection Time: 03/18/17  4:27 AM   Result Value Ref Range    Magnesium 1.7 1.6 - 2.4 mg/dL   T3, FREE    Collection Time: 03/18/17 10:50 AM   Result Value Ref Range    Free Triiodothyronine 2.1 (L) 2.2 - 4.0 pg/mL   T4, FREE    Collection Time: 03/18/17 10:50 AM   Result Value Ref Range    T4, Free 1.3 0.8 - 1.5 NG/DL   CORTISOL    Collection Time: 03/18/17 10:50 AM   Result Value Ref Range    Cortisol, random 29.6 ug/dL       Problem List:  Problem List as of 3/18/2017  Date Reviewed: 3/17/2017          Codes Class Noted - Resolved    * (Principal)Acute delirium ICD-10-CM: R41.0  ICD-9-CM: 780.09  3/17/2017 - Present        Mild cognitive impairment with memory loss ICD-10-CM: G31.84  ICD-9-CM: 331.83, 780.93  1/28/2017 - Present        Venous insufficiency of both lower extremities ICD-10-CM: I87.2  ICD-9-CM: 459.81  9/9/2015 - Present        ACP (advance care planning) ICD-10-CM: Z71.89  ICD-9-CM: V65.49  3/26/2015 - Present    Overview Signed 3/26/2015  1:33 PM by [address], MD     3/26/2015 signed in office with Dr. Toni Austin             Paroxysmal atrial fibrillation [facility]) ICD-10-CM: I48.0  ICD-9-CM: 427.31  10/25/2014 - Present    Overview Signed 3/26/2015 12:36 PM by MD Dr. Jose R Sánchez hypertension ICD-10-CM: I10  ICD-9-CM: 401.9  9/8/2011 - Present        Osteoporosis ICD-10-CM: M81.0  ICD-9-CM: 733.00  9/8/2011 - Present    Overview Addendum 7/27/2015 10:11 AM by [address], MD     S/p 10 years of Fosamax stopped March 2014.    8/2014 - left proximal femur fracture post fall  Restarted Fosamax March 2015.              Depression with anxiety ICD-10-CM: F41.8  ICD-9-CM: 300.4  9/8/2011 - Present    Overview Signed 1/28/2016 10:23 AM by [address], MD     Stopped antidepressant Sept 2014             RESOLVED: Hip fracture, left [facility]) ICD-10-CM: N78.742A  ICD-9-CM: 820.8  8/2/2014 - 1/28/2016        RESOLVED: GERD (gastroesophageal reflux disease) ICD-10-CM: K21.9  ICD-9-CM: 530.81  9/8/2011 - 3/27/2014              Medications reviewed  Current Facility-Administered Medications   Medication Dose Route Frequency    acetaminophen (TYLENOL) tablet 650 mg  650 mg Oral Q6H PRN    calcium-vitamin D (OS-KARLA) 500 mg-200 unit tablet  1 Tab Oral BID WITH MEALS    dilTIAZem CD (CARDIZEM CD) capsule 120 mg  120 mg Oral DAILY    loperamide (IMODIUM) capsule 2 mg  2 mg Oral Q8H PRN    rivaroxaban (XARELTO) tablet 15 mg  15 mg Oral DAILY WITH LUNCH    senna-docusate (PERICOLACE) 8.6-50 mg per tablet 1 Tab  1 Tab Oral DAILY PRN    therapeutic multivitamin (THERAGRAN) tablet 1 Tab  1 Tab Oral DAILY    0.9% sodium chloride infusion  100 mL/hr IntraVENous CONTINUOUS    ondansetron (ZOFRAN) injection 4 mg  4 mg IntraVENous Q4H PRN    levoFLOXacin (LEVAQUIN) 250 mg in D5W IVPB  250 mg IntraVENous Q24H    levothyroxine (SYNTHROID) tablet 25 mcg  25 mcg Oral ACB    famotidine (PEPCID) tablet 10 mg  10 mg Oral ACB       Care Plan discussed with: Nurse    Total time spent with patient: 30 minutes.     Geena Morton MD

## 2017-03-18 NOTE — PROGRESS NOTES
TRANSFER - IN REPORT:    [facility]  being received from ER(unit) for change in patient condition(AMS and hypothermia)      Report consisted of patients Situation, Background, Assessment and   Recommendations(SBAR). Information from the following report(s) SBAR, Kardex, ED Summary, Procedure Summary, Intake/Output, MAR, Recent Results and Cardiac Rhythm NSR was reviewed with the receiving nurse. Opportunity for questions and clarification was provided. 1902-Patient arrives to floor. Primary Nurse Saran Gonzalez and Danii Mora RN performed a dual skin assessment on this patient No impairment noted (patient has various scabs, sacrum red but blanchable, right foot third toe has small red area)  Brown score is 18  Daughter at the bedside. Patient placed on jay hugger. 1940-Rn at the bedside. Patient blood drawn and IV fluids started. Bedside shift change report given to Mercy Pedersen (oncoming nurse) by Madeline Langley RN (offgoing nurse). Report included the following information SBAR, Kardex, ED Summary, Procedure Summary, Intake/Output, MAR, Recent Results and Cardiac Rhythm NSR.

## 2017-03-18 NOTE — PROGRESS NOTES
Bedside and Verbal shift change report given to Bridgette (oncoming nurse) by Jared (offgoing nurse). Report included the following information SBAR, Kardex, Intake/Output, MAR, Accordion and Recent Results. 2000 - assumed care of pt. Pt resting comfortably, no c/o pain, alert to person only, drowsy; VSS, exception temp: last rectal 96.5 F; Braydon hugger in place. 2100 - Family at bedside, reviewed plan of care. HR mildly tachycardic, 110s-120s, asymptomatic. Daughter states pt missed morning cardizem dose, offered to get one time dose for missed dose, daughter declined at this time, will monitor. 0000 - pt restless, changed for incontinence x1. Rectal temp 99.4 F, Braydon hugger on standby; will monitor. 0400 - pt maintaining temp, rectal temp 98.8 F, Braydon hugger remains on standby, will monitor. 0600 - changed for episode of incontinence x1, partial bath given. Pt more alert, remains confused, alert to self only but more alert. No c/o, VSS, HR 90s-110s, will monitor. Bedside and Verbal shift change [facility] by SHMUEL (offgoing nurse). Report included the following information SBAR, Intake/Output, MAR, Accordion and Recent Results.

## 2017-03-18 NOTE — PROGRESS NOTES
Problem: Mobility Impaired (Adult and Pediatric)  Goal: *Acute Goals and Plan of Care (Insert Text)  Physical Therapy Goals  3/18/2017  1. Patient will move from supine to sit and sit to supine , scoot up and down and roll side to side in bed with independence within 7 day(s). 2. Patient will transfer from bed to chair and chair to bed with independence using the least restrictive device within 7 day(s). 3. Patient will perform sit to stand with independence within 7 day(s). 4. Patient will ambulate with supervision/set-up for 50 feet with the least restrictive device within 7 day(s). PHYSICAL THERAPY EVALUATION  Patient: Brenda Weber (51 y.o. female)  Date: 3/18/2017  Primary Diagnosis: Acute delirium        Precautions: fall         ASSESSMENT :  Based on the objective data described below, the patient presents with impaired mobility and gait due to a UTI. Patient needs to be ambulatory to return to her ILR apt. They do have therapy that can see her and she has been to [facility] in the past if needed. Daughter is present who is an RN here and very supportive and encouraging. Patient was fairly groggy but able to come to sitting EOB. Stood and took a few steps to the side to get to the [facility]. Returned to sitting and her daughter stayed with her and will get the nursing staff to help get her back in the bed. Patient should progress with ambulation. Uses a RW at baseline and her daughter will bring hers in. Her daughter will get her OOB tomorrow if she is better and we will follow up on Monday. Patient will benefit from skilled intervention to address the above impairments.   Patients rehabilitation potential is considered to be Good  Factors which may influence rehabilitation potential include:   [X]         None noted  [ ]         Mental ability/status  [ ]         Medical condition  [ ]         Home/family situation and support systems  [ ]         Safety awareness  [ ]         Pain tolerance/management  [ ]         Other:        PLAN :  Recommendations and Planned Interventions:  [X]           Bed Mobility Training             [ ]    Neuromuscular Re-Education  [X]           Transfer Training                   [ ]    Orthotic/Prosthetic Training  [X]           Gait Training                         [ ]    Modalities  [ ]           Therapeutic Exercises           [ ]    Edema Management/Control  [ ]           Therapeutic Activities            [X]    Patient and Family Training/Education  [ ]           Other (comment):     Frequency/Duration: Patient will be followed by physical therapy  5 times a week to address goals. Discharge Recommendations: To Be Determined-needs to be ambulatory to return to her apt. Further Equipment Recommendations for Discharge: none       SUBJECTIVE:   Patient stated No (when asked if she wants to walk).       OBJECTIVE DATA SUMMARY:   HISTORY:    Past Medical History:   Diagnosis Date    A-fib ([facility] [address]      Dementia      GERD (gastroesophageal reflux disease)      Hypertension      Osteoporosis       Past Surgical History:   Procedure Laterality Date    HX HEENT         Tonsillectomy    HX MALIGNANT SKIN LESION EXCISION   2015     basal cell left forehead - Sandeep Moffett    HX ORTHOPAEDIC   2007     R TKR - Dr. Emmanuel Gates HX ORTHOPAEDIC   2004     L TKR    HX ORTHOPAEDIC   2014     partial left hip replacement     Prior Level of Function/Home Situation: AL  Personal factors and/or comorbidities impacting plan of care:      Home Situation  Home Environment: Assisted living  One/Two Story Residence: One story  Living Alone: No  Support Systems: Family member(s)  Patient Expects to be Discharged to[de-identified] Assisted living  Current DME Used/Available at Home: Walker, rolling     EXAMINATION/PRESENTATION/DECISION MAKING:   Critical Behavior:  Neurologic State: Drowsy  Orientation Level: Oriented to person, Disoriented to place, Disoriented to situation, Disoriented to time  Cognition: Decreased attention/concentration, Decreased command following, Memory loss, Impaired decision making     Hearing: Auditory  Auditory Impairment: None  Skin:  Per nursing. Edema: per nursing. Range Of Motion:  AROM: Generally decreased, functional           PROM: Generally decreased, functional           Strength:    Strength: Within functional limits                    Tone & Sensation:   Tone: Normal              Sensation: Intact               Coordination:  Coordination: Within functional limits  Vision:      Functional Mobility:  Bed Mobility:  Rolling: Minimum assistance;Assist x1;Additional time  Supine to Sit: Minimum assistance;Assist x1;Additional time     Scooting: Moderate assistance;Assist x1  Transfers:  Sit to Stand: Assist x2;Contact guard assistance  Stand to Sit: Contact guard assistance;Assist x2                       Balance:   Sitting: Intact  Standing: Impaired  Standing - Static: Constant support; Fair  Standing - Dynamic : Fair  Ambulation/Gait Training:  Distance (ft): 3 Feet (ft)  Assistive Device: Walker, rolling;Gait belt  Ambulation - Level of Assistance: Assist x1;Contact guard assistance     Gait Description (WDL): Exceptions to WDL           Base of Support: Narrowed     Speed/Lynda: Pace decreased (<100 feet/min); Shuffled  Step Length: Left shortened;Right shortened                             Short shuffling steps to the [facility].                Stairs:                             Functional Measure:  Barthel Index:      Bathin  Bladder: 10  Bowels: 10  Groomin  Dressin  Feeding: 10  Mobility: 0  Stairs: 0  Toilet Use: 0  Transfer (Bed to Chair and Back): 0  Total: 35         Barthel and G-code impairment scale:  Percentage of impairment CH  0% CI  1-19% CJ  20-39% CK  40-59% CL  60-79% CM  80-99% CN  100%   Barthel Score 0-100 100 99-80 79-60 59-40 20-39 1-19    0   Barthel Score 0-20 20 17-19 13-16 9-12 5-8 1-4 0      The Barthel ADL Index: Guidelines  1. The index should be used as a record of what a patient does, not as a record of what a patient could do. 2. The main aim is to establish degree of independence from any help, physical or verbal, however minor and for whatever reason. 3. The need for supervision renders the patient not independent. 4. A patient's performance should be established using the best available evidence. Asking the patient, friends/relatives and nurses are the usual sources, but direct observation and common sense are also important. However direct testing is not needed. 5. Usually the patient's performance over the preceding 24-48 hours is important, but occasionally longer periods will be relevant. 6. Middle categories imply that the patient supplies over 50 per cent of the effort. 7. Use of aids to be independent is allowed. Lidia Marsh., Barthel, KEE. (5572). Functional evaluation: the Barthel Index. [address] [facility] (14)2. Kim Patel mata HOMA Deleon, Flaquita Zuniga., Sulma Aaron., [facility], [address] [place] [address] (1999). Measuring the change indisability after inpatient rehabilitation; comparison of the responsiveness of the Barthel Index and Functional [place] Measure. Journal of Neurology, Neurosurgery, and Psychiatry, 66(4), 326-903. Annmarie Kolb, N.[place].A, SAVANNA Burgos.MEGA, & Paige Khan M.ROSALINE. (2004.) Assessment of post-stroke quality of life in cost-effectiveness studies: The usefulness of the Barthel Index and the EuroQoL-5D. Quality of Life Research, 13, 140-45         G codes: In compliance with CMSs Claims Based Outcome Reporting, the following G-code set was chosen for this patient based on their primary functional limitation being treated: The outcome measure chosen to determine the severity of the functional limitation was the Barthel with a score of 35/100 which was correlated with the impairment scale.       · Mobility - Walking and Moving Around:               - CURRENT STATUS:    CL - 60%-79% impaired, limited or restricted               - GOAL STATUS:           CK - 40%-59% impaired, limited or restricted               - D/C STATUS:                       ---------------To be determined---------------               Pain:  Pain Scale 1: Numeric (0 - 10)  Pain Intensity 1: 0              Activity Tolerance:   Good. Please refer to the flowsheet for vital signs taken during this treatment. After treatment:   [ ]         Patient left in no apparent distress sitting up in chair  [X]         Patient left in no apparent distress sitting EOB with daughter.   [X]         Call bell left within reach  [X]         Nursing notified  [X]         Caregiver present  [ ]         Bed alarm activated      COMMUNICATION/EDUCATION:   The patients plan of care was discussed with: Registered Nurse.  [X]         Fall prevention education was provided and the patient/caregiver indicated understanding. [X]         Patient/family have participated as able in goal setting and plan of care. [X]         Patient/family agree to work toward stated goals and plan of care. [ ]         Patient understands intent and goals of therapy, but is neutral about his/her participation. [ ]         Patient is unable to participate in goal setting and plan of care.      Thank you for this referral.  Tung Huffman, PT   Time Calculation: 20 mins

## 2017-03-19 NOTE — PROGRESS NOTES
Hospitalist Progress Note         Steven Zee MD     Call physician on-call through the  7pm-7am    Daily Progress Note: 3/19/2017    Admission summary and hospital course   60-year-old woman with   a past medical history significant for dementia, chronic atrial fibrillation,   on Xarelto and osteoporosis, who was in her usual state of health until   about 2 days ago, when the patient developed change in mental status   at the nursing home where the patient resides    Assessment/Plan: 1. Acute delirium/ metabolic encephalopathy :  Could be because of UTI . Treat same , CT head normal , also subclinical hypothyroidism may be responsible , see TSH and free T4 results noted supports same , now improving per daughter   2. Acute cystitis, without hematuria. Will start the patient on Levaquin. await a urine culture. ,gram negative rods   3. Hypertension. home medications, and monitor the   patient's blood pressure closely. Started back on maxzide   4. Dementia. supportive therapy. 5. Dehydration. hydration with normal saline. Improving   6. Hypercalcemia. Will carry out hydration with normal saline , likly due to dehydration , corrected today is 11   7. Atrial fibrillation. Xarelto for anticoagulation. 8. Hypothermia. rewarming process started in the   emergency room. now off , again will heck cortisol is normal  , hypothyroidism could contribute, no won low dose levothyroxine     9. Subclinical hypothyroidism. Will start the patient on low-dose   Synthroid. T3 is normal . Will treat even if TSH not above 10 because of altered mental status and hypothermia     Discharge am , CM consult in am   PT /OT to see     VTE prophylaxis: SCD/lovenox  Discussed plan of care with Patient/Family and Nurse   Pre-admission lived at   Discharge planning:PT/OT          Subjective: The patient is without any acute complaints at this time.     Discussed in details about plan of treatment with the daughter and that she can be discharged in am   dtr thinks she would need higher level of care       Review of Systems:   A comprehensive review of systems was negative except mentioned in A/P    Objective:   Physical Exam:     Visit Vitals    /65    Pulse 68    Temp 97.3 °F (36.3 °C)    Resp 16    Ht 5' 3\" (1.6 m)    Wt 76.2 kg (167 lb 15.9 oz)    SpO2 97%    BMI 29.76 kg/m2      O2 Device: Room air    Temp (24hrs), Av.5 °F (36.4 °C), Min:97.3 °F (36.3 °C), Max:97.8 °F (36.6 °C)    701 - 1900  In: 1263.3 [P.O.:240; I.V.:1023.3]  Out: 250 [Urine:250]   1901 -  0700  In: 3176.7 [P.O.:300; I.V.:2876.7]  Out: -     General:  Alert, cooperative, no distress, appears stated age. Lungs:   Clear to auscultation bilaterally. Chest wall:  No tenderness or deformity. Heart:  Regular rate and rhythm, S1, S2 normal, no murmur, click, rub or gallop. Abdomen:   Soft, non-tender. Bowel sounds normal. No masses,  No organomegaly. Extremities: Extremities normal, atraumatic, no cyanosis or edema. Pulses: 2+ and symmetric all extremities. Skin: Skin color, texture, turgor normal. No rashes or lesions   Neurologic: CNII-XII intact. Data Review:       Recent Days:  Recent Labs      17   0427  17   1212   WBC  5.5  6.5   HGB  10.8*  11.8   HCT  33.4*  36.2   PLT  203  213     Recent Labs      17   0326  17   0427  17   1212   NA  140  144  144   K  3.7  4.6  4.5   CL  106  109*  108   CO2  23  25  28   GLU  72  67  78   BUN  22*  30*  38*   CREA  0.98  1.04*  1.10*   CA  9.6  9.9  10.5*   MG   --   1.7   --    PHOS   --   2.1*   --    ALB   --   2.6*  3.1*   SGOT   --   41*  46*   ALT   --   42  48     No results for input(s): PH, PCO2, PO2, HCO3, FIO2 in the last 72 hours.     24 Hour Results:  Recent Results (from the past 24 hour(s))   METABOLIC PANEL, BASIC    Collection Time: 17  3:26 AM   Result Value Ref Range    Sodium 140 136 - 145 mmol/L    Potassium 3.7 3.5 - 5.1 mmol/L    Chloride 106 97 - 108 mmol/L    CO2 23 21 - 32 mmol/L    Anion gap 11 5 - 15 mmol/L    Glucose 72 65 - 100 mg/dL    BUN 22 (H) 6 - 20 MG/DL    Creatinine 0.98 0.55 - 1.02 MG/DL    BUN/Creatinine ratio 22 (H) 12 - 20      GFR est AA >60 >60 ml/min/1.73m2    GFR est non-AA 53 (L) >60 ml/min/1.73m2    Calcium 9.6 8.5 - 10.1 MG/DL       Problem List:  Problem List as of 3/19/2017  Date Reviewed: 3/17/2017          Codes Class Noted - Resolved    * (Principal)Acute delirium ICD-10-CM: R41.0  ICD-9-CM: 780.09  3/17/2017 - Present        Mild cognitive impairment with memory loss ICD-10-CM: G31.84  ICD-9-CM: 331.83, 780.93  1/28/2017 - Present        Venous insufficiency of both lower extremities ICD-10-CM: I87.2  ICD-9-CM: 459.81  9/9/2015 - Present        ACP (advance care planning) ICD-10-CM: Z71.89  ICD-9-CM: V65.49  3/26/2015 - Present    Overview Signed 3/26/2015  1:33 PM by Viktoria Resendiz MD     3/26/2015 signed in office with Dr. Chester Reinoso             Paroxysmal atrial fibrillation [facility]) ICD-10-CM: I48.0  ICD-9-CM: 427.31  10/25/2014 - Present    Overview Signed 3/26/2015 12:36 PM by MD Dr. Tomi Pastor hypertension ICD-10-CM: I10  ICD-9-CM: 401.9  9/8/2011 - Present        Osteoporosis ICD-10-CM: M81.0  ICD-9-CM: 733.00  9/8/2011 - Present    Overview Addendum 7/27/2015 10:11 AM by Viktoria Resendiz MD     S/p 10 years of Fosamax stopped March 2014.    8/2014 - left proximal femur fracture post fall  Restarted Fosamax March 2015.              Depression with anxiety ICD-10-CM: F41.8  ICD-9-CM: 300.4  9/8/2011 - Present    Overview Signed 1/28/2016 10:23 AM by Viktoria Resendiz MD     Stopped antidepressant Sept 2014             RESOLVED: [facility] ICD-10-CM: A13.663U  ICD-9-CM: 820.8  8/2/2014 - 1/28/2016        RESOLVED: GERD (gastroesophageal reflux disease) ICD-10-CM: K21.9  ICD-9-CM: 530.81  9/8/2011 - 3/27/2014              Medications reviewed  Current Facility-Administered Medications   Medication Dose Route Frequency    triamterene-hydroCHLOROthiazide (MAXZIDE) 37.5-25 mg per tablet 1 Tab  1 Tab Oral DAILY    0.9% sodium chloride infusion  100 mL/hr IntraVENous CONTINUOUS    acetaminophen (TYLENOL) tablet 650 mg  650 mg Oral Q6H PRN    calcium-vitamin D (OS-KARLA) 500 mg-200 unit tablet  1 Tab Oral BID WITH MEALS    dilTIAZem CD (CARDIZEM CD) capsule 120 mg  120 mg Oral DAILY    loperamide (IMODIUM) capsule 2 mg  2 mg Oral Q8H PRN    rivaroxaban (XARELTO) tablet 15 mg  15 mg Oral DAILY WITH LUNCH    senna-docusate (PERICOLACE) 8.6-50 mg per tablet 1 Tab  1 Tab Oral DAILY PRN    therapeutic multivitamin (THERAGRAN) tablet 1 Tab  1 Tab Oral DAILY    ondansetron (ZOFRAN) injection 4 mg  4 mg IntraVENous Q4H PRN    levoFLOXacin (LEVAQUIN) 250 mg in D5W IVPB  250 mg IntraVENous Q24H    levothyroxine (SYNTHROID) tablet 25 mcg  25 mcg Oral ACB    famotidine (PEPCID) tablet 10 mg  10 mg Oral ACB       Care Plan discussed with: Nurse    Total time spent with patient: 30 minutes.     Jenni Noguera MD

## 2017-03-19 NOTE — PROGRESS NOTES
2020:Assessment completed on pt, pt has no complaints of pain, pt refusing to bed turned at this time, daughter at bedside. 2305:pt found trying to get out of bed, pt assisted to bedside commode, pt urinated and had medium sized BM, new brief placed on pt, pt assisted back into bed, repositioned in bed.  0400:pt found on side of bed with bed alarm going off, pt assisted to bedside commode, pt urinated, pt cleaned new brief applied and pt assisted back into bed, pt turned on left side. 0616:pt brief's dry, pt resting in bed with no complaints at this time, pt turned on right side.

## 2017-03-19 NOTE — PROGRESS NOTES
Bedside shift change report given to Virginia (oncoming nurse) by RAAD Vicente (offgoing nurse). Report included the following information SBAR, Kardex, Procedure Summary and Recent Results.

## 2017-03-19 NOTE — PROGRESS NOTES
Bedside shift change report given to Azeem Long (oncoming nurse) by Navjot Murillo RN (offgoing nurse). Report included the following information SBAR, Kardex, ED Summary, Procedure Summary, Intake/Output, MAR, Recent Results and Cardiac Rhythm NSR/SB.

## 2017-03-20 PROBLEM — N39.0 UTI (URINARY TRACT INFECTION): Status: ACTIVE | Noted: 2017-01-01

## 2017-03-20 PROBLEM — R41.0 ACUTE DELIRIUM: Status: RESOLVED | Noted: 2017-01-01 | Resolved: 2017-01-01

## 2017-03-20 NOTE — ROUTINE PROCESS
UPDATE:  Patient discharged home to her AL apartment at [facility] today. Faxed discharge paperwork, including PT/OT orders to the AL side at [phone]. Patient's daughter will transport patient home. Patient admitted for UTI. Lives in [facility], the [facility] of [address] [place] [address]. Patient's daughter works at [employer]. Family prefers patient return to AL at discharge with increased caregivers. Will follow. Care Management Interventions  PCP Verified by CM:  Yes  Last Visit to PCP: 01/26/17  Mode of Transport at Discharge:  (family?)  Transition of Care Consult (CM Consult): Assisted Living ([address] at [facility])  Discharge Durable Medical Equipment: No  Physical Therapy Consult: Yes  Occupational Therapy Consult: Yes  Speech Therapy Consult: No  Current Support Network: Assisted Living ([facility])  Discharge Location  Discharge Placement: Assisted Living

## 2017-03-20 NOTE — PROGRESS NOTES
Problem: Self Care Deficits Care Plan (Adult)  Goal: *Acute Goals and Plan of Care (Insert Text)  Occupational Therapy Goals  Initiated 3/20/2017  1. Patient will perform grooming standing at the sink with supervision/set-up within 7 day(s). 2. Patient will perform lower body dressing with supervision/set-up within 7 day(s). 3. Patient will perform bathing with supervision/set-up within 7 day(s). 4. Patient will perform walk in toilet transfers with supervision/set-up within 7 day(s). 5. Patient will perform all aspects of toileting with supervision/set-up within 7 day(s). 6. Patient will participate in upper extremity therapeutic exercise/activities with supervision/set-up within 7 day(s). 7. Patient will utilize energy conservation techniques during functional activities with verbal cues within 7 day(s). OCCUPATIONAL THERAPY EVALUATION  Patient: Karli Mann (71 y.o. female)  Date: 3/20/2017  Primary Diagnosis: Acute delirium        Precautions:   Bed Alarm (hx of dementia)      ASSESSMENT :  Based on the objective data described below, the patient presents with generalized weakness, decreased functional reach to feet, decreased activity tolerance with HR up to 115 - 120 during standing ADL activity, decreased standing tolerance, difficulty with managing bowel hygiene during toileting and difficulty completing LB ADLs IND. Pt with baseline cognitive deficits due to hx of dementia and requires simple 1 step commands to complete ADL tasks this am with overall MODA for LB dressing, TIMI bathing and MIN/MOD A for toileting. Pt is able to complete functional transfers with only CGA and demonstrated good standing balance this am.  Noted drop from 311 systolic to 918 systolic pre/post therapy; RN present in room and ok with pt remaining in chair and reported she will follow up later this am to re assess BP. Some documentation in chart indicates pt lives in [address], some indicate halfway.   At this time feel pt is not able to care for herself in IND living facility and would be more appropriate for senior living with increased assist for toileting hygiene/dressing/bathing. If not available pt may require SNF placement. .     Patient will benefit from skilled intervention to address the above impairments. Patients rehabilitation potential is considered to be Good  Factors which may influence rehabilitation potential include:   [ ]             None noted  [ ]             Mental ability/status  [X]             Medical condition  [X]             Home/family situation and support systems (unsure of prior living situation)  [X]             Safety awareness  [ ]             Pain tolerance/management  [ ]             Other:        PLAN :  Recommendations and Planned Interventions:  [X]               Self Care Training                  [X]        Therapeutic Activities  [X]               Functional Mobility Training    [ ]        Cognitive Retraining  [X]               Therapeutic Exercises           [X]        Endurance Activities  [X]               Balance Training                   [ ]        Neuromuscular Re-Education  [ ]               Visual/Perceptual Training     [X]   Home Safety Training  [X]               Patient Education                 [X]        Family Training/Education  [ ]               Other (comment):     Frequency/Duration: Patient will be followed by occupational therapy 5 times a week to address goals. Discharge Recommendations: senior living with increased assist vs SNF  Further Equipment Recommendations for Discharge: TBD       SUBJECTIVE:   Patient stated Judge Speaker more to go. .. Sarah larsen I don't have more!  (referring to getting dressed and having only 2 feet to put socks on).       OBJECTIVE DATA SUMMARY:   HISTORY:   Past Medical History:   Diagnosis Date    [facility] ([facility]      Dementia      GERD (gastroesophageal reflux disease)      Hypertension      Osteoporosis       Past Surgical History:   Procedure Laterality Date    HX HEENT         Tonsillectomy    HX MALIGNANT SKIN LESION EXCISION   2015     basal cell left forehead - [facility]   2007     R TKR - Dr. Calero Second HX ORTHOPAEDIC   2004     L TKR    HX ORTHOPAEDIC   2014     partial left hip replacement        Prior Level of Function/Home Situation: Pt reports she is IND with ADLs and ambulates with a RW however admits to now knowing if she lives in [address] or [facility]. Pt reports she lives alone. Some documentation in chart states [facility], some states ILF. Expanded or extensive additional review of patient history:      Home Situation  Home Environment: Assisted living  One/Two Story Residence: One story  Living Alone: No  Support Systems: Family member(s)  Patient Expects to be Discharged to[de-identified] Assisted living  Current DME Used/Available at Home: Walker, rolling  [X]  Right hand dominant             [ ]  Left hand dominant     EXAMINATION OF PERFORMANCE DEFICITS:  Cognitive/Behavioral Status:  Neurologic State: Confused     Cognition: Decreased attention/concentration; Follows commands;Memory loss     Skin: *intact  Edema: LE  Hearing: Auditory  Auditory Impairment: None  Vision/Perceptual:            Acuity: Able to read normal print without difficulty    Corrective Lenses: Glasses  Range of Motion:  AROM: Within functional limits        Strength:  Strength: Generally decreased, functional     Coordination:  Coordination: Within functional limits  Fine Motor Skills-Upper: Left Intact; Right Intact    Gross Motor Skills-Upper: Left Intact; Right Intact  Tone & Sensation:  Tone: Normal  Sensation: Intact     Balance:  Sitting: Intact  Standing: Intact; With support     Functional Mobility and Transfers for ADLs:  Bed Mobility:  Supine to Sit:  (received up in the chair)     Transfers:  Sit to Stand: Contact guard assistance  Toilet Transfer : Contact guard assistance (to [facility])     ADL Assessment:  Feeding: Independent     Oral Facial Hygiene/Grooming: Independent     Bathing: Minimum assistance     Upper Body Dressing: Supervision     Lower Body Dressing: Moderate assistance     Toileting: Minimum assistance     ADL Intervention and task modifications:  Feeding  Feeding Assistance: Independent  Container Management: Independent  Food to Mouth: Independent  Drink to Mouth: Independent     Upper Body Bathing  Bathing Assistance: Supervision/set-up  Position Performed: Seated in chair     Lower Body Bathing  Bathing Assistance: Contact guard assistance  Perineal  : Contact guard assistance  Position Performed: Standing  Lower Body : Supervision/set-up  Position Performed: Bending forward method;Seated in chair     Upper Body [address]: Supervision/ set-up     Lower Body Dressing Assistance  Protective Undergarmet: Minimum assistance (to thread over the L LE)  Socks: Moderate assistance (assist due to fatigue/elevated HR)  Leg Crossed Method Used: No  Position Performed: Seated in chair     Toileting  Toileting Assistance: Minimum assistance  Bladder Hygiene: Contact guard assistance  Bowel Hygiene: Minimum assistance; Moderate assistance (assist for thoroughness)  Clothing Management: Contact guard assistance  Adaptive Equipment: Walker     Cognitive Retraining  Following Commands: Follows one step commands/directions (simple 1 step commands)     Functional Measure:  Barthel Index:      Bathin  Bladder: 10  Bowels: 10  Groomin  Dressin  Feeding: 10  Mobility: 0  Stairs: 0  Toilet Use: 5  Transfer (Bed to Chair and Back): 10  Total: 55         Barthel and G-code impairment scale:  Percentage of impairment CH  0% CI  1-19% CJ  20-39% CK  40-59% CL  60-79% CM  80-99% CN  100%   Barthel Score 0-100 100 99-80 79-60 59-40 20-39 1-19    0   Barthel Score 0-20 20 17-19 13-16 9-12 5-8 1-4 0      The Barthel ADL Index: Guidelines  1. The index should be used as a record of what a patient does, not as a record of what a patient could do.   2. The main aim is to establish degree of independence from any help, physical or verbal, however minor and for whatever reason. 3. The need for supervision renders the patient not independent. 4. A patient's performance should be established using the best available evidence. Asking the patient, friends/relatives and nurses are the usual sources, but direct observation and common sense are also important. However direct testing is not needed. 5. Usually the patient's performance over the preceding 24-48 hours is important, but occasionally longer periods will be relevant. 6. Middle categories imply that the patient supplies over 50 per cent of the effort. 7. Use of aids to be independent is allowed. Darryle Chant., Barthel, D.W. (9113). Functional evaluation: the Barthel Index. [facility] (14)2. Kim Muir mata GERMAN DeleonF, Eliot Molina., Jonny Mcdonald., [place], [address] (1999). Measuring the change indisability after inpatient rehabilitation; comparison of the responsiveness of the Barthel Index and Functional [place] Measure. Journal of Neurology, Neurosurgery, and Psychiatry, 66(4), 251-75. Jose Christian, N.TOMAS.A, SANDRA Burgos, & Sonam Siddiqui, MDeuceA. (2004.) Assessment of post-stroke quality of life in cost-effectiveness studies: The usefulness of the Barthel Index and the EuroQoL-5D. Quality of Life Research, 13, 365-50         G codes: In compliance with CMSs Claims Based Outcome Reporting, the following G-code set was chosen for this patient based on their primary functional limitation being treated: The outcome measure chosen to determine the severity of the functional limitation was the Barthel Index with a score of 55/100 which was correlated with the impairment scale.       · Self Care:               - CURRENT STATUS:    CK - 40%-59% impaired, limited or restricted               - GOAL STATUS:           CI - 1%-19% impaired, limited or restricted               - D/C STATUS:                       ---------------To be determined---------------      Occupational Therapy Evaluation Charge Determination   History Examination Decision-Making   LOW Complexity : Brief history review  LOW Complexity : 1-3 performance deficits relating to physical, cognitive , or psychosocial skils that result in activity limitations and / or participation restrictions  LOW Complexity : No comorbidities that affect functional and no verbal or physical assistance needed to complete eval tasks       Based on the above components, the patient evaluation is determined to be of the following complexity level: LOW   Pain:  Pain Scale 1: Numeric (0 - 10)  Pain Intensity 1: 0              Activity Tolerance:   No s/s of distress; BP down to 285 systolic from 454 following therapy however pt declined getting back into bed and reported feeling good. RN present in room and reported she will return to re assess BP shortly. Vitals:     03/20/17 0811 03/20/17 0907 03/20/17 0916 03/20/17 0928   BP: 148/79 130/44       BP 1 Location: Left arm         BP Patient Position: At rest Pre-activity; Sitting During activity; Sitting During activity;Standing   Pulse: 82 76 65 (!) 117   Resp: 16         Temp: 97.5 °F (36.4 °C)         SpO2: 95% 95% 92% 93%   Weight:           Height:             Please refer to the flowsheet for vital signs taken during this treatment. After treatment:   [X] Patient left in no apparent distress sitting up in chair  [ ] Patient left in no apparent distress in bed  [X] Call bell left within reach  [X] Nursing notified  [ ] Caregiver present  [X] chair alarm activated      COMMUNICATION/EDUCATION:   The patients plan of care was discussed with: Physical Therapist and Registered Nurse.  [X] Home safety education was provided and the patient/caregiver indicated understanding. [X] Patient/family have participated as able in goal setting and plan of care.   [X] Patient/family agree to work toward stated goals and plan of care. [ ] Patient understands intent and goals of therapy, but is neutral about his/her participation. [ ] Patient is unable to participate in goal setting and plan of care. This patients plan of care is appropriate for delegation to JASON.      Thank you for this referral.  Ángel Odom OT  Time Calculation: 50 mins

## 2017-03-20 NOTE — DISCHARGE INSTRUCTIONS
Discharge Instructions       PATIENT ID: Tessa Silva  MRN: [id number]   YOB: 1924    DATE OF ADMISSION: 3/17/2017 11:00 AM    DATE OF DISCHARGE: 3/20/2017    PRIMARY CARE PROVIDER: Henry Hendricks MD     ATTENDING PHYSICIAN: Venita Whyte MD  DISCHARGING PROVIDER: Venita Whyte MD    To contact this individual call [phone] and ask the  to page. If unavailable ask to be transferred the Adult Hospitalist Department. DISCHARGE DIAGNOSES     Acute delirium    E. Coli urinary tract infection    Dehydration    Hypertension    Dementia    Subclinical hypothyroidism    Atrial fibrillation    CONSULTATIONS: IP CONSULT TO HOSPITALIST    PROCEDURES/SURGERIES: * No surgery found *    PENDING TEST RESULTS:   At the time of discharge the following test results are still pending:     None. FOLLOW UP APPOINTMENTS:   Follow-up Information     Follow up With Details Comments 550 Damian Jorgensen MD Schedule an appointment as soon as possible for a visit in 1 week post-hospital follow-up;  you should have your thyroid function tests rechecked in 6 weeks [address] [place] [address] Internists  [place] [address]  [phone]             ADDITIONAL CARE RECOMMENDATIONS:     You were started on 2 new medications during this hospitalization as follows:   (1) Levofloxacin or Levaquin, which is an antibiotic used to treat your urinary tract infection. You will take this for an additional 3 days to complete course of antibiotics. The most common side effect is diarrhea, which are liquid bowel movements. If you have more than 3 episodes of diarrhea in a day or diarrhea with fevers, please either call Dr. Aliyah Hahn office or come to the hospital for re-evaluation. (2) Levothyroxine or Synthroid, which is a thyroid medication used to treat subclinical hypothyroidism.   You should have your thyroid function re-tested in about 4-6 weeks with your primary care provider. DIET: Regular Diet    ACTIVITY: PT/OT per Home Health    WOUND CARE: N/A    EQUIPMENT needed: N/A      DISCHARGE MEDICATIONS:   See Medication Reconciliation Form    · It is important that you take the medication exactly as they are prescribed. · Keep your medication in the bottles provided by the pharmacist and keep a list of the medication names, dosages, and times to be taken in your wallet. · Do not take other medications without consulting your doctor. NOTIFY YOUR PHYSICIAN FOR ANY OF THE FOLLOWING:   Fever over 101 degrees for 24 hours. Chest pain, shortness of breath, fever, chills, nausea, vomiting, diarrhea, change in mentation, falling, weakness, bleeding. Severe pain or pain not relieved by medications. Or, any other signs or symptoms that you may have questions about. DISPOSITION:    Home With:  x OT x PT x HH  RN       SNF/Inpatient Rehab/LTAC   x Independent/assisted living    Hospice    Other:     CDMP Checked:   Yes x     PROBLEM LIST Updated:  Yes x       Signed:   Shravan Wang MD  3/20/2017  1:35 PM           Urinary Tract Infection in Women: Care Instructions  Your Care Instructions    A urinary tract infection, or UTI, is a general term for an infection anywhere between the kidneys and the urethra (where urine comes out). Most UTIs are bladder infections. They often cause pain or burning when you urinate. UTIs are caused by bacteria and can be cured with antibiotics. Be sure to complete your treatment so that the infection goes away. Follow-up care is a key part of your treatment and safety. Be sure to make and go to all appointments, and call your doctor if you are having problems. It's also a good idea to know your test results and keep a list of the medicines you take. How can you care for yourself at home? · Take your antibiotics as directed. Do not stop taking them just because you feel better. You need to take the full course of antibiotics.   · Drink extra water and other fluids for the next day or two. This may help wash out the bacteria that are causing the infection. (If you have kidney, heart, or liver disease and have to limit fluids, talk with your doctor before you increase your fluid intake.)  · Avoid drinks that are carbonated or have caffeine. They can irritate the bladder. · Urinate often. Try to empty your bladder each time. · To relieve pain, take a hot bath or lay a heating pad set on low over your lower belly or genital area. Never go to sleep with a heating pad in place. To prevent UTIs  · Drink plenty of water each day. This helps you urinate often, which clears bacteria from your system. (If you have kidney, heart, or liver disease and have to limit fluids, talk with your doctor before you increase your fluid intake.)  · Consider adding cranberry juice to your diet. · Urinate when you need to. · Urinate right after you have sex. · Change sanitary pads often. · Avoid douches, bubble baths, feminine hygiene sprays, and other feminine hygiene products that have deodorants. · After going to the bathroom, wipe from front to back. When should you call for help? Call your doctor now or seek immediate medical care if:  · Symptoms such as fever, chills, nausea, or vomiting get worse or appear for the first time. · You have new pain in your back just below your rib cage. This is called flank pain. · There is new blood or pus in your urine. · You have any problems with your antibiotic medicine. Watch closely for changes in your health, and be sure to contact your doctor if:  · You are not getting better after taking an antibiotic for 2 days. · Your symptoms go away but then come back. Where can you learn more? Go to http://adama-marcio.info/. Enter J890 in the search box to learn more about \"Urinary Tract Infection in Women: Care Instructions. \"  Current as of: August 12, 2016  Content Version: 11.1  © 9006-3605 [employer]. Care instructions adapted under license by [employer] (which disclaims liability or warranty for this information). If you have questions about a medical condition or this instruction, always ask your healthcare professional. [address] any warranty or liability for your use of this information.

## 2017-03-20 NOTE — PROGRESS NOTES
15:17 Patient temp was 95.2 rectal-Dr. Connie Emerson notified. Advised to warp in warm blankets and continue to  Monitor. Will reassess in 30 minutes. 17:30 Pt temp was 96.0 rectal-Dr. Connie Emerson notified. Per doctor discharge as previously ordered. Pt assisted by daughter with getting dressed and will be transported by tech down to discharge area.

## 2017-03-20 NOTE — PROGRESS NOTES
Clinical Pharmacy Note: Re: IV to PO Automatic Conversion - Antibiotic    Please note: Lesa Ortiz medication(s) (Levaquin  has/have been changed from IV to PO based on the following criteria:    The patient:  1. Has received IV therapy for at least 48 hours   2. Has a functioning GI tract  - Taking scheduled oral medications  - Tolerating tube feeds at goal rate or a full liquid, soft, or regular diet         3. Is clinically stable        - Temperature < 100.4F for at least 24 hours        - WBC is trending down    This IV to PO conversion is based on the [employer] approved automatic conversion policy for eligible patients. Please call with questions.

## 2017-03-20 NOTE — PROGRESS NOTES
Bedside and Verbal shift change report given to Andria Harden (oncoming nurse) by Torin Hanley (offgoing nurse). Report included the following information SBAR, Kardex, ED Summary, Intake/Output, MAR and Cardiac Rhythm NSR. Informed Long Cavlo MD of change in pt rhythm from NSR/SB to A fib this morning. No orders given    0752 Bedside and Verbal shift change report given to Anette Wyman (oncoming nurse) by Andria Harden (offgoing nurse). Report included the following information SBAR, Kardex, ED Summary, Intake/Output, MAR and Cardiac Rhythm A fib.

## 2017-03-20 NOTE — PROGRESS NOTES
Problem: Mobility Impaired (Adult and Pediatric)  Goal: *Acute Goals and Plan of Care (Insert Text)  Physical Therapy Goals  3/18/2017  1. Patient will move from supine to sit and sit to supine , scoot up and down and roll side to side in bed with independence within 7 day(s). 2. Patient will transfer from bed to chair and chair to bed with independence using the least restrictive device within 7 day(s). 3. Patient will perform sit to stand with independence within 7 day(s). 4. Patient will ambulate with supervision/set-up for 50 feet with the least restrictive device within 7 day(s). PHYSICAL THERAPY TREATMENT  Patient: Brenda Weber (45 y.o. female)  Date: 3/20/2017  Diagnosis: Acute delirium Acute delirium       Precautions: Bed Alarm (hx of dementia) , fall      ASSESSMENT:  Pt received up to the chair, family present. Pt remains confused, pleasantly so. She required min assist to come to standing to her rolling walker and then amb 80 feet using her walker with contact guard and min assist for walker management for turns. Had her follow her granddaughter for amb. Pt is mobilizing much better than on the weekend. At this point I recommend 24/7 assist for return to assisted living. Progression toward goals:  [ ]    Improving appropriately and progressing toward goals  [X]    Improving slowly and progressing toward goals  [ ]    Not making progress toward goals and plan of care will be adjusted       PLAN:  Patient continues to benefit from skilled intervention to address the above impairments. Continue treatment per established plan of care. Discharge Recommendations:  Home Health  Further Equipment Recommendations for Discharge:  none       SUBJECTIVE:   Patient stated It feels very good to walk.       OBJECTIVE DATA SUMMARY:   Chart checked, pt cleared by nursing.   Critical Behavior:  Neurologic State: Confused  Orientation Level: Oriented to person, Disoriented to time, Disoriented to situation, Disoriented to place  Cognition: Decreased attention/concentration, Follows commands, Memory loss     Functional Mobility Training:  Bed Mobility:     Supine to Sit:  (received up in the chair)              Transfers:  Sit to Stand: Minimum assistance  Stand to Sit: Contact guard assistance                       Interventions: Verbal cues; Safety awareness training     Balance:  Sitting: Intact; With support  Standing: Intact; With support  Ambulation/Gait Training:  Distance (ft): 80 Feet (ft)  Assistive Device: Gait belt;Walker, rolling  Ambulation - Level of Assistance: Assist x1;Contact guard assistance                 Base of Support: Narrowed     Speed/Lynda: Slow  Step Length: Left shortened;Right shortened                               Stairs:                       Neuro Re-Education:     Therapeutic Exercises:      Pain:  Pain Scale 1: Numeric (0 - 10)  Pain Intensity 1: 0              Activity Tolerance:   HR up to 118 at end of amb, then recovered post amb  Please refer to the flowsheet for vital signs taken during this treatment. After treatment:   [X]    Patient left in no apparent distress sitting up in chair on activated alarm pad, family present. If family leaves the room they are to tell pt's nurse so pt can be returned to bed, pt very confused.   [ ]    Patient left in no apparent distress in bed  [X]    Call bell left within reach  [X]    Nursing notified  [X]    Caregiver present  [ ]    Bed alarm activated      COMMUNICATION/COLLABORATION:   The patients plan of care was discussed with: Occupational Therapist and Registered Nurse     Anaid Bolivar   Time Calculation: 16 mins

## 2017-03-20 NOTE — DISCHARGE SUMMARY
Discharge Summary       PATIENT ID: Brenda Weber  MRN: [id number]   YOB: 1924    DATE OF ADMISSION: 3/17/2017 11:00 AM    DATE OF DISCHARGE: 3/20/17   PRIMARY CARE PROVIDER: Everardo Malagon MD     ATTENDING PHYSICIAN: Kate Bolton MD  DISCHARGING PROVIDER: Sharyle Griffins, MD    To contact this individual call [phone] and ask the  to page. If unavailable ask to be transferred the Adult Hospitalist Department. CONSULTATIONS: IP CONSULT TO HOSPITALIST    PROCEDURES/SURGERIES: * No surgery found *    ADMITTING DIAGNOSES & HOSPITAL COURSE:   80year old female with history of dementia, chronic atrial fibrillation, osteoporosis presented on 3/171/17 with altered mental status. She was found to have E. Coli UTI. 1.  Acute delirium / acute toxic-metabolic encephalopathy   - felt to be secondary to urinary tract infection   - UA 3/17 - 20-50 WBCs, 4+ bacteria   - urine culture 3/17 - E. Coli sensitive to levofloxacin   - CT head 3/17 - no acute abnormality   - she had been treated with empiric levofloxacin with improvement in her mentation back to baseline dementia    2.  E. Coli UTI   - as above   - she had been treated with empiric levofloxacin, which her E. Coli was sensitive to, she will continue this to complete ~1 week course of antibiotic therapy    3. Hypertension   - resumed patient's home Maxzide    4. Dementia   - she was treated supportively    5. Dehydration   - she had received IV fluids during her hospitalization with improvement    6. Hypercalcemia   - Ca2+ 10.5 upon admission, this was felt to be partially a function of dehydration, she had received IV fluids with improvement back to normal range    7. Atrial fibrillation   - rate controlled during her hospitalization   - she was continued on her home Xarelto    8.   Hypothermia   - suspect this was secondary to subclinical hypothyroidism in part   - she had received warming blanket in ED, which she was subsequently off   - she had no other signs suggestive of sepsis   - she was noted to be mildly hypothermic with T 95.2, then T 96 prior to her discharge, but was otherwise asymptomatic   - random cortisol 29.6    9. Subclinical hypothyroidism   - TSH 7.04 (elevated), FT4 1.3, FT3 2.1 (low)   - given her clinical constellation of symptoms, she was started on levothyroxine 25 mcg        DISCHARGE DIAGNOSES / PLAN:      1. See above. PENDING TEST RESULTS:   At the time of discharge the following test results are still pending: None. FOLLOW UP APPOINTMENTS:    Follow-up Information     Follow up With Details Comments 550 Damian Jorgensen MD Schedule an appointment as soon as possible for a visit in 1 week post-hospital follow-up;  you should have your thyroid function tests rechecked in 6 weeks [address] [facility]  [place] [address]  [phone]             ADDITIONAL CARE RECOMMENDATIONS:    You were started on 2 new medications during this hospitalization as follows:   (1) Levofloxacin or Levaquin, which is an antibiotic used to treat your urinary tract infection. You will take this for an additional 3 days to complete course of antibiotics. The most common side effect is diarrhea, which are liquid bowel movements. If you have more than 3 episodes of diarrhea in a day or diarrhea with fevers, please either call Dr. Domo Reardon office or come to the hospital for re-evaluation. (2) Levothyroxine or Synthroid, which is a thyroid medication used to treat subclinical hypothyroidism. You should have your thyroid function re-tested in about 4-6 weeks with your primary care provider.     DIET: Regular Diet    ACTIVITY: PT/OT per Home Health    WOUND CARE: N/A    EQUIPMENT needed: N/A        DISCHARGE MEDICATIONS:  Current Discharge Medication List      START taking these medications    Details   levothyroxine (SYNTHROID) 25 mcg tablet Take 1 Tab by mouth Daily (before breakfast). Qty: 30 Tab, Refills: 0      levoFLOXacin (LEVAQUIN) 250 mg tablet Take 1 Tab by mouth daily for 3 days. Qty: 3 Tab, Refills: 0         CONTINUE these medications which have NOT CHANGED    Details   alendronate (FOSAMAX) 70 mg tablet Take 70 mg by mouth every Monday. dilTIAZem CD (CARDIZEM CD) 120 mg ER capsule Take 120 mg by mouth daily. rivaroxaban (XARELTO) 15 mg tab tablet Take 15 mg by mouth daily (with lunch). therapeutic multivitamin (THERA) tablet Take 1 Tab by mouth daily. raNITIdine (ZANTAC 75) 75 mg tablet Take 75 mg by mouth Daily (before breakfast). acetaminophen (TYLENOL) 325 mg tablet Take 650 mg by mouth every six (6) hours as needed for Pain. loperamide (IMODIUM) 2 mg capsule Take 2 mg by mouth every eight (8) hours as needed for Diarrhea.      senna-docusate (PERICOLACE) 8.6-50 mg per tablet Take 1 Tab by mouth daily as needed for Constipation. triamterene-hydrochlorothiazide (MAXZIDE) 37.5-25 mg per tablet Take 1 Tab by mouth daily. calcium-vitamin D (CALCIUM 500+D) 500 mg(1,250mg) -200 unit per tablet Take 1 Tab by mouth two (2) times daily (with meals). NOTIFY YOUR PHYSICIAN FOR ANY OF THE FOLLOWING:   Fever over 101 degrees for 24 hours. Chest pain, shortness of breath, fever, chills, nausea, vomiting, diarrhea, change in mentation, falling, weakness, bleeding. Severe pain or pain not relieved by medications. Or, any other signs or symptoms that you may have questions about.     DISPOSITION:  x  Home With:  x OT x PT x HH  RN       Long term SNF/Inpatient Rehab   x Independent/assisted living    Hospice    Other:       PATIENT CONDITION AT DISCHARGE:     Functional status    Poor    x Deconditioned     Independent      Cognition     Lucid     Forgetful    x Dementia      Catheters/lines (plus indication)    [place]     PICC     PEG    x None      Code status     Full code    x DNR      PHYSICAL EXAMINATION AT DISCHARGE:  Visit Vitals    /56 (BP 1 Location: Left arm, BP Patient Position: Sitting)    Pulse 67    Temp 97.4 °F (36.3 °C)    Resp 16    Ht 5' 3\" (1.6 m)    Wt 75.9 kg (167 lb 5.3 oz)    SpO2 97%    BMI 29.64 kg/m2     General:  Alert, cooperative, no distress, appears stated age. Head:  Normocephalic, without obvious abnormality, atraumatic. Eyes:  Conjunctivae/corneas clear. PERRL, EOMs intact. Nose: Nares normal. Septum midline. Mucosa normal. No drainage or sinus tenderness. Throat: Lips, mucosa, and tongue normal. Teeth and gums normal.   Neck: Supple, symmetrical, trachea midline, no adenopathy, thyroid: no enlargement/tenderness/nodules, no carotid bruit and no JVD. Back:   Symmetric, no curvature. ROM normal. No CVA tenderness. Lungs:   Clear to auscultation bilaterally. Chest wall:  No tenderness or deformity. Heart:  Regular rate and rhythm, S1, S2 normal, no murmur, click, rub or gallop. Abdomen:   Soft, non-tender. Bowel sounds normal. No masses,  No organomegaly. Extremities: Extremities normal, atraumatic, no cyanosis or edema. Pulses: 2+ and symmetric all extremities. Skin: Skin color, texture, turgor normal. No rashes or lesions   Neurologic: CNII-XII intact. Oriented to self only. Otherwise non-focal examination.        CHRONIC MEDICAL DIAGNOSES:  Problem List as of 3/20/2017  Date Reviewed: 3/20/2017          Codes Class Noted - Resolved    UTI (urinary tract infection) ICD-10-CM: N39.0  ICD-9-CM: 599.0  3/20/2017 - Present        Mild cognitive impairment with memory loss ICD-10-CM: G31.84  ICD-9-CM: 331.83, 780.93  1/28/2017 - Present        Venous insufficiency of both lower extremities ICD-10-CM: I87.2  ICD-9-CM: 459.81  9/9/2015 - Present        ACP (advance care planning) ICD-10-CM: Z71.89  ICD-9-CM: V65.49  3/26/2015 - Present    Overview Signed 3/26/2015  1:33 PM by Rhoda Mansfield MD     3/26/2015 signed in office with Dr. Fortino Mccabe Paroxysmal atrial fibrillation [facility]) ICD-10-CM: I48.0  ICD-9-CM: 427.31  10/25/2014 - Present    Overview Signed 3/26/2015 12:36 PM by MD Dr. Tomi Sánchez hypertension ICD-10-CM: Laura White  ICD-9-CM: 401.9  9/8/2011 - Present        Osteoporosis ICD-10-CM: M81.0  ICD-9-CM: 733.00  9/8/2011 - Present    Overview Addendum 7/27/2015 10:11 AM by [address], MD     S/p 10 years of Fosamax stopped March 2014.    8/2014 - left proximal femur fracture post fall  Restarted Fosamax March 2015.              Depression with anxiety ICD-10-CM: F41.8  ICD-9-CM: 300.4  9/8/2011 - Present    Overview Signed 1/28/2016 10:23 AM by [address], MD     Stopped antidepressant Sept 2014             * (Principal)RESOLVED: Acute delirium ICD-10-CM: R41.0  ICD-9-CM: 780.09  3/17/2017 - 3/20/2017        RESOLVED: Hip fracture, left ([address] ICD-10-CM: S72.002A  ICD-9-CM: 820.8  8/2/2014 - 1/28/2016        RESOLVED: GERD (gastroesophageal reflux disease) ICD-10-CM: K21.9  ICD-9-CM: 530.81  9/8/2011 - 3/27/2014              Greater than 30 minutes were spent with the patient on counseling and coordination of care    Signed:   Lauryn Gill MD  3/20/2017  1:42 PM

## 2017-03-21 NOTE — PROGRESS NOTES
This note will not be viewable in [address] Transition Of Care Note    NNTOCIP     Date/Time:  3/21/2017 2:39 PM    Patient listed on discharge (MSSP) report on 3/21/17. Patient discharged from [facility] for AMS. RRAT score: 12 Low    Medical History:     Past Medical History:   Diagnosis Date    A-fib ([address]     Dementia     GERD (gastroesophageal reflux disease)     Hypertension     Osteoporosis      Nurse Navigator(NN) contacted the patient by telephone to perform post hospital discharge assessment. Verified  and address with patient as identifiers. Provided introduction to self, and explanation of the Nurses Navigator role. Spoke with Jazmin Washington, daughter of patient (on HIPPA). MsDeuce Jacque Le is a RN at [facility] [employer] [facility] [place]. Patient is back at [address] [place] [address]. Hospital course:  1. Acute delirium / acute toxic-metabolic encephalopathy  - felt to be secondary to urinary tract infection  - UA 3/17 - 20-50 WBCs, 4+ bacteria  - urine culture 3/17 - E. Coli sensitive to levofloxacin  - CT head 3/17 - no acute abnormality  - she had been treated with empiric levofloxacin with improvement in her mentation back to baseline dementia     2. E. Coli UTI  - as above  - she had been treated with empiric levofloxacin, which her E. Coli was sensitive to, she will continue this to complete ~1 week course of antibiotic therapy     3. Hypertension  - resumed patient's home Maxzide     4. Dementia  - she was treated supportively     5. Dehydration  - she had received IV fluids during her hospitalization with improvement     6. Hypercalcemia  - Ca2+ 10.5 upon admission, this was felt to be partially a function of dehydration, she had received IV fluids with improvement back to normal range     7. Atrial fibrillation  - rate controlled during her hospitalization  - she was continued on her home Xarelto     8.  Hypothermia  - suspect this was secondary to subclinical hypothyroidism in part  - she had received warming blanket in ED, which she was subsequently off  - she had no other signs suggestive of sepsis  - she was noted to be mildly hypothermic with T 95.2, then T 96 prior to her discharge, but was otherwise asymptomatic  - random cortisol 29.6     9. Subclinical hypothyroidism  - TSH 7.04 (elevated), FT4 1.3, FT3 2.1 (low)  - given her clinical constellation of symptoms, she was started on levothyroxine 25 mcg    Activity:    Patient reports: mostly moving around the house    Medication:   Performed medication reconciliation with patient's daughter, who verbalizes understanding of administration of home medications. There were no barriers to obtaining medications identified at this time. Ms. Tae Cole understands patient will need labs drawn in 4-6 weeks following the implementation of levothyroxine. Support system:  Patient lives in [address] [place] [address] ([address] [place] [address]). Discharge Instructions :  Reviewed discharge instructions with patient's daughter. Ms. Burgos [place] understanding of discharge instructions and follow-up care. Ms. Tae Cole states that [address] [place] [address] will have a NP on site starting in April and will likely transition her mother to the NP there. Due to the difficulty of getting patient to the office for f/u, it was agreed to schedule f/u appt with Dr. Casey Munoz 4/5/17. Ms. Tae Cole will call and cancel appt if AL NP is able to see patient sooner. Red Flags:  Change in mentation, fever, chills, change in appetite, voiding, chest pain, shortness of breath, gait disturbance. Labs Reviewed:  No results for input(s): WBC, HGB, HCT, PLT, HGBEXT, HCTEXT, PLTEXT in the last 72 hours. Recent Labs      03/19/17   0326   NA  140   K  3.7   CL  106   CO2  23   GLU  72   BUN  22*   CREA  0.98   CA  9.6     No components found for: GLPOC  No results for input(s): PH, PCO2, PO2, HCO3, FIO2 in the last 72 hours. No results for input(s): INR in the last 72 hours.     No lab exists for component: INREXT  Urine cx- e-coli (levaquin at discharge)  UA - 4+ bacteria    TSH: 7.04 (started on levothyroxine)      Imaging results reviewed:  Head CT - no acute abnormalities  CXR: IMPRESSION:  1. Mild cardiomegaly  2. The lung volumes are low but the lungs are clear of an acute process    Advance Care Planning:   Patient was offered the opportunity to discuss advance care planning:  no     Does patient have an Advance Directive:  yes   If no, did you provide information on Caring Connections?  no     PCP/Specialist follow up: Patient scheduled to follow up with Dr. Waqas Allen on 4/5/17. Reviewed red flags with patient, and patient verbalizes understanding. Patient given an opportunity to ask questions. No other clinical/social/functional needs noted. The patient agrees to contact the PCP office for questions related to their healthcare. The patient expressed thanks, offered no additional questions and ended the call. Plan:  Monitor patient for s/s of AMS, UTI - Ms. Eva Mari is a RN and is well versed regarding when to contact PCP vs ED. Monitor for s/e from new rx (levaquin and levothyroxine)  Patient scheduled with [facility] for f/u 4/5/17. Plan is to transition patient to on site NP at [facility] in April. If patient is able to establish with NP at [facility] sooner than appt with PCP, Ms. Eva Mari will cancel appt.

## 2017-04-05 NOTE — PROGRESS NOTES
Subjective:     Chief Complaint   Patient presents with   [facility] Follow Up     She  is a 80y.o. year old female who presents for evaluation. Recently hospitalized for sepsis from UTI and also found to be hypothyroid. Lives in assisted living with supervision of medications. No pain today. Historical Data    Past Medical History:   Diagnosis Date    A-fib ([facility] [address]     Dementia     GERD (gastroesophageal reflux disease)     Hypertension     Osteoporosis         Past Surgical History:   Procedure Laterality Date    HX HEENT      Tonsillectomy    HX MALIGNANT SKIN LESION EXCISION  2015    basal cell left forehead - [facility]TH[facility]IC  2007    R TKR - Dr. Jyotsna Rasheed HX ORTHOPAEDIC  2004    L TKR    HX ORTHOPAEDIC  2014    partial left hip replacement        Outpatient Encounter Prescriptions as of 4/5/2017   Medication Sig Dispense Refill    levothyroxine (SYNTHROID) 25 mcg tablet Take 1 Tab by mouth Daily (before breakfast). 30 Tab 0    alendronate (FOSAMAX) 70 mg tablet Take 70 mg by mouth every Monday.  dilTIAZem CD (CARDIZEM CD) 120 mg ER capsule Take 120 mg by mouth daily.  rivaroxaban (XARELTO) 15 mg tab tablet Take 15 mg by mouth daily (with lunch).  therapeutic multivitamin (THERA) tablet Take 1 Tab by mouth daily.  raNITIdine (ZANTAC 75) 75 mg tablet Take 75 mg by mouth Daily (before breakfast).  acetaminophen (TYLENOL) 325 mg tablet Take 650 mg by mouth every six (6) hours as needed for Pain.  triamterene-hydrochlorothiazide (MAXZIDE) 37.5-25 mg per tablet Take 1 Tab by mouth daily.  calcium-vitamin D (CALCIUM 500+D) 500 mg(1,250mg) -200 unit per tablet Take 1 Tab by mouth two (2) times daily (with meals).  loperamide (IMODIUM) 2 mg capsule Take 2 mg by mouth every eight (8) hours as needed for Diarrhea.  senna-docusate (PERICOLACE) 8.6-50 mg per tablet Take 1 Tab by mouth daily as needed for Constipation.        No facility-administered encounter medications on file as of 4/5/2017. Allergies   Allergen Reactions    Codeine Other (comments)     Dizziness    Keflex [Cephalexin] Rash        Social History     Social History    Marital status:      Spouse name: N/A    Number of children: N/A    Years of education: N/A     Occupational History    Not on file. Social History Main Topics    Smoking status: Former Smoker     Quit date: 1/15/1964    Smokeless tobacco: Never Used    Alcohol use No    Drug use: No    Sexual activity: No     Other Topics Concern    Not on file     Social History Narrative        Review of Systems  Pertinent items are noted in HPI. Objective:     Vitals:    04/05/17 1448   BP: 140/60   Pulse: 62   Resp: 20   Temp: 96 °F (35.6 °C)   TempSrc: Oral   SpO2: 96%   Weight: 156 lb 6.4 oz (70.9 kg)   Height: 5' 3\" (1.6 m)     Pleasant elderly WF in no acute distress. Eyes: No scleral icterus. Throat: Clear  Neck:  Supple  Cardiac:  Irregularly irregular rhythm. Lungs: Clear to auscultation. Abdomen: Benign  Extremities: Trace edema  Neuro:  Strength is equal and full. ASSESSMENT / PLAN:   1. Urinary tract infection with hematuria, site unspecified  · Resolved    2. Paroxysmal atrial fibrillation (HCC)  · Continue OAC.  - CBC WITH AUTOMATED DIFF; Future    3. Essential hypertension  · Controlled  - METABOLIC PANEL, BASIC; Future    4. Mild cognitive impairment with memory loss  · Stable  · Avoid infections, etc    5. Hypothyroidism due to acquired atrophy of thyroid    - TSH REFLEX TO T4; Future    Patient Instructions   Stay as physically active as possible. Eat a well rounded diet. Continue your current medications. Have follow up blood work in late May. Follow-up Disposition:  Return in about 4 months (around 8/5/2017) for F/u hypothyroidism.    Advised her to call back or return to office if symptoms worsen/change/persist.  Discussed expected course/resolution/complications of diagnosis in detail with patient. Medication risks/benefits/costs/interactions/alternatives discussed with patient. She was given an after visit summary which includes diagnoses, current medications, & vitals. She expressed understanding with the diagnosis and plan.

## 2017-04-05 NOTE — PROGRESS NOTES
Chief Complaint   Patient presents with   [facility] Follow Up     Reviewed record in preparation for visit and have obtained necessary documentation. Identified pt with two pt identifiers(name and ). Health Maintenance Due   Topic    DTaP/Tdap/Td series (1 - Tdap)    GLAUCOMA SCREENING Q2Y          Chief Complaint   Patient presents with   [facility] Follow Up        Wt Readings from Last 3 Encounters:   17 156 lb 6.4 oz (70.9 kg)   17 167 lb 5.3 oz (75.9 kg)   17 166 lb 9.6 oz (75.6 kg)     Temp Readings from Last 3 Encounters:   17 96 °F (35.6 °C) (Oral)   17 96 °F (35.6 °C)   16 97.1 °F (36.2 °C) (Oral)     BP Readings from Last 3 Encounters:   17 140/60   17 140/57   17 116/68     Pulse Readings from Last 3 Encounters:   17 62   17 95   17 (!) 58           Learning Assessment:  :     Learning Assessment 3/26/2015 2013   PRIMARY LEARNER Patient Patient   HIGHEST LEVEL OF EDUCATION - PRIMARY LEARNER  > 4 YEARS OF COLLEGE -   BARRIERS PRIMARY LEARNER NONE -   CO-LEARNER CAREGIVER No -   PRIMARY LANGUAGE ENGLISH ENGLISH    NEED No -   LEARNER PREFERENCE PRIMARY DEMONSTRATION READING     READING -     LISTENING -   LEARNING SPECIAL TOPICS no -   ANSWERED BY patient patient   RELATIONSHIP SELF SELF       Depression Screening:  :     PHQ 2 / 9, over the last two weeks 2016   Little interest or pleasure in doing things Not at all   Feeling down, depressed or hopeless Not at all   Total Score PHQ 2 0       Fall Risk Assessment:  :     Fall Risk Assessment, last 12 mths 2017   Able to walk? Yes   Fall in past 12 months? Yes   Fall with injury? No   Number of falls in past 12 months 1   Fall Risk Score 1       Abuse Screening:  :     Abuse Screening Questionnaire 3/26/2015   Do you ever feel afraid of your partner? (No Data)   Are you in a relationship with someone who physically or mentally threatens you?  (No Data)   Is it safe for you to go home? (No Data)       Coordination of Care Questionnaire:  :     1) Have you been to an emergency room, urgent care clinic since your last visit? no   Hospitalized since your last visit? yes           2) Have you seen or consulted any other health care providers outside of [address] [place] [address] since your last visit? no  (Include any pap smears or colon screenings in this section.)    3) Do you have an Advance Directive on file? yes    4) Are you interested in receiving information on Advance Directives? NO      Patient is accompanied by daughter I have received verbal consent from Juno Zamudio to discuss any/all medical information while they are present in the room. Reviewed record  In preparation for visit and have obtained necessary documentation.

## 2017-04-05 NOTE — MR AVS SNAPSHOT
Visit Information Date & Time Provider Department Dept. Phone Encounter #  
 4/5/2017  2:45 PM MD [facility] [phone] [id number] Your Appointments 5/25/2017 10:30 AM  
ROUTINE CARE with MD [facility] [address] [facility]) Appt Note: [address], [address] [place]  
[address], [place] [address] [address] Upcoming Health Maintenance Date Due DTaP/Tdap/Td series (1 - Tdap) 11/4/1945 GLAUCOMA SCREENING Q2Y 6/1/2017 MEDICARE YEARLY EXAM 5/27/2017 Allergies as of 4/5/2017  Review Complete On: 4/5/2017 By: Flory Williamson MD  
  
 Severity Noted Reaction Type Reactions Codeine  09/08/2011   Intolerance Other (comments) Dizziness Keflex [Cephalexin]  07/27/2015    Rash Current Immunizations  Reviewed on 7/27/2015 Name Date Influenza High Dose Vaccine PF 10/1/2016 Influenza Vaccine 10/15/2015, 10/23/2014, 9/23/2013 Influenza Vaccine Split 10/19/2011 Influenza Vaccine Whole 10/13/2012 Pneumococcal Conjugate (PCV-13) 7/27/2015 Pneumococcal Vaccine (Unspecified Type) 1/1/2006 Not reviewed this visit You Were Diagnosed With   
  
 Codes Comments Paroxysmal atrial fibrillation (HCC)    -  Primary ICD-10-CM: I48.0 ICD-9-CM: 427.31 Essential hypertension     ICD-10-CM: I10 
ICD-9-CM: 401.9 Mild cognitive impairment with memory loss     ICD-10-CM: G31.84 ICD-9-CM: 331.83, 780.93 Hypothyroidism due to acquired atrophy of thyroid     ICD-10-CM: E03.4 ICD-9-CM: 244.8, 246.8 Vitals BP Pulse Temp Resp Height(growth percentile) Weight(growth percentile) 140/60 (BP 1 Location: Right arm, BP Patient Position: Sitting) 62 96 °F (35.6 °C) (Oral) 20 5' 3\" (1.6 m) 156 lb 6.4 oz (70.9 kg) SpO2 BMI OB Status Smoking Status 96% 27.71 kg/m2 Postmenopausal Former Smoker BMI and BSA Data Body Mass Index Body Surface Area  
 27.71 kg/m 2 1.78 m 2 Preferred Pharmacy Pharmacy Name Phone [facility]ight[facility], [place] [address] [phone] Your Updated Medication List  
  
   
This list is accurate as of: 4/5/17  3:05 PM.  Always use your most recent med list.  
  
  
  
  
 acetaminophen 325 mg tablet Commonly known as:  TYLENOL Take 650 mg by mouth every six (6) hours as needed for Pain. CALCIUM 500+D 500 mg(1,250mg) -200 unit per tablet Generic drug:  calcium-vitamin D Take 1 Tab by mouth two (2) times daily (with meals). CARDIZEM  mg ER capsule Generic drug:  dilTIAZem CD Take 120 mg by mouth daily. FOSAMAX 70 mg tablet Generic drug:  alendronate Take 70 mg by mouth every Monday. levothyroxine 25 mcg tablet Commonly known as:  SYNTHROID Take 1 Tab by mouth Daily (before breakfast). loperamide 2 mg capsule Commonly known as:  IMODIUM Take 2 mg by mouth every eight (8) hours as needed for Diarrhea.  
  
 senna-docusate 8.6-50 mg per tablet Commonly known as:  Wava Dun Take 1 Tab by mouth daily as needed for Constipation. THERA tablet Generic drug:  therapeutic multivitamin Take 1 Tab by mouth daily. triamterene-hydroCHLOROthiazide 37.5-25 mg per tablet Commonly known as:  Ruthe Salon Take 1 Tab by mouth daily. XARELTO 15 mg Tab tablet Generic drug:  rivaroxaban Take 15 mg by mouth daily (with lunch). ZANTAC 75 75 mg tablet Generic drug:  raNITIdine Take 75 mg by mouth Daily (before breakfast). To-Do List   
 05/15/2017 Lab:  CBC WITH AUTOMATED DIFF   
  
 05/15/2017 Lab:  METABOLIC PANEL, BASIC   
  
 05/15/2017 Lab:  TSH REFLEX TO T4 Patient Instructions Stay as physically active as possible. Eat a well rounded diet. Continue your current medications. Have follow up blood work in late May. Introducing [facility]! Yoanaann Mas introduces [facility] patient portal. Now you can access parts of your medical record, email your doctor's office, and request medication refills online. 1. In your internet browser, go to [email or web address]://[email or web address] 2. Click on the First Time User? Click Here link in the Sign In box. You will see the New Member Sign Up page. 3. Enter your [facility] Access Code exactly as it appears below. You will not need to use this code after youve completed the sign-up process. If you do not sign up before the expiration date, you must request a new code. · [facility] Access Code: [email or web address] Expires: 6/15/2017 12:05 PM 
 
4. Enter the last four digits of your Social Security Number (xxxx) and Date of Birth (mm/dd/yyyy) as indicated and click Submit. You will be taken to the next sign-up page. 5. Create a [facility] ID. This will be your [facility] login ID and cannot be changed, so think of one that is secure and easy to remember. 6. Create a [facility] password. You can change your password at any time. 7. Enter your Password Reset Question and Answer. This can be used at a later time if you forget your password. 8. Enter your e-mail address. You will receive e-mail notification when new information is available in [address] 9. Click Sign Up. You can now view and download portions of your medical record. 10. Click the Download Summary menu link to download a portable copy of your medical information. If you have questions, please visit the Frequently Asked Questions section of the [facility] website. Remember, [facility] is NOT to be used for urgent needs. For medical emergencies, dial 911. Now available from your iPhone and Android! Please provide this summary of care documentation to your next provider. Your primary care clinician is listed as Jerri Colon. If you have any questions after today's visit, please call [phone].

## 2017-04-05 NOTE — PATIENT INSTRUCTIONS
Stay as physically active as possible. Eat a well rounded diet. Continue your current medications. Have follow up blood work in late May.

## 2017-05-17 NOTE — TELEPHONE ENCOUNTER
Received a fax today from [facility]. A UA and culture was done on 5/11/17 (ordered by the on site NP who is not there on a daily basis to review the results) - shows few epis, many bacteria, 10-25 WBCs, 2+ Leukocytes, neg nitrite, neg blood. Culture grew > 100,000 Citrobacter Freundii. Spoke to Alysha Dhillon, pt's daughter who states Sofía Carmen is less confused this week and doing well. She declined office visit. Nurse today reports her urine continues to be malodorous and she has been more confused. Decision made to treat based on nursing report, UA and sensitivities of the urine culture with cipro 250 mg bid x 3 days. I was told by nursing staff that a prescription dated TODAY would be considered LATE as it wouldn't be delivered until 10:30-11 pm this evening and they would be unable to document the reason for \"late med\"  appropriately. I was specifically asked to post date the script for tomorrow (???). I promptly advised that if she declines in any way overnight, antibiotics should be given immediately and she should be taken to the emergency room.       Script faxed to pharmacy at [phone]

## 2017-06-26 PROBLEM — N39.0 UTI (URINARY TRACT INFECTION): Status: RESOLVED | Noted: 2017-01-01 | Resolved: 2017-01-01

## 2017-06-26 NOTE — MR AVS SNAPSHOT
Visit Information Date & Time Provider Department Dept. Phone Encounter #  
 6/26/2017 10:15 AM Cari Gould MD [place] [id number] [facility] [phone] Follow-up Instructions Return in about 4 months (around 10/26/2017) for F/U general condition. Upcoming Health Maintenance Date Due DTaP/Tdap/Td series (1 - Tdap) 11/4/1945 GLAUCOMA SCREENING Q2Y 6/1/2017 INFLUENZA AGE 9 TO ADULT 8/1/2017 MEDICARE YEARLY EXAM 6/27/2018 Allergies as of 6/26/2017  Review Complete On: 6/26/2017 By: Cari Gould MD  
  
 Severity Noted Reaction Type Reactions Codeine  09/08/2011   Intolerance Other (comments) Dizziness Keflex [Cephalexin]  07/27/2015    Rash Current Immunizations  Reviewed on 6/26/2017 Name Date Influenza High Dose Vaccine PF 10/1/2016 Influenza Vaccine 10/15/2015, 10/23/2014, 9/23/2013 Influenza Vaccine Split 10/19/2011 Influenza Vaccine Whole 10/13/2012 Pneumococcal Conjugate (PCV-13) 7/27/2015 Pneumococcal Vaccine (Unspecified Type) 1/1/2006 Tdap 6/1/2016 Reviewed by Enedina Harvey LPN on 6/29/6487 at 65:13 AM  
You Were Diagnosed With   
  
 Codes Comments Routine general medical examination at a health care facility     ICD-10-CM: Z00.00 ICD-9-CM: V70.0 Screening for alcoholism     ICD-10-CM: Z13.89 ICD-9-CM: V79.1 Vitals BP Pulse Temp Resp Height(growth percentile) 110/60 (BP 1 Location: Left arm, BP Patient Position: Sitting) (!) 59 96.4 °F (35.8 °C) (Axillary) 18 5' 3\" (1.6 m) Weight(growth percentile) SpO2 BMI OB Status Smoking Status 149 lb 8 oz (67.8 kg) 98% 26.48 kg/m2 Postmenopausal Former Smoker Vitals History BMI and BSA Data Body Mass Index Body Surface Area  
 26.48 kg/m 2 1.74 m 2 Preferred Pharmacy Pharmacy Name Phone [facility] [address] [facility], [address] [phone] Your Updated Medication List  
  
 This list is accurate as of: 6/26/17 10:45 AM.  Always use your most recent med list.  
  
  
  
  
 acetaminophen 325 mg tablet Commonly known as:  TYLENOL Take 650 mg by mouth every six (6) hours as needed for Pain. CALCIUM 500+D 500 mg(1,250mg) -200 unit per tablet Generic drug:  calcium-vitamin D Take 1 Tab by mouth two (2) times daily (with meals). CARDIZEM  mg ER capsule Generic drug:  dilTIAZem CD Take 120 mg by mouth daily. FOSAMAX 70 mg tablet Generic drug:  alendronate Take 70 mg by mouth every Monday. levothyroxine 25 mcg tablet Commonly known as:  SYNTHROID Take 1 Tab by mouth Daily (before breakfast). loperamide 2 mg capsule Commonly known as:  IMODIUM Take 2 mg by mouth every eight (8) hours as needed for Diarrhea. OTHER Indications: hreminio hose kve at bedtimeee hi. apply tp legs in the morning. senna-docusate 8.6-50 mg per tablet Commonly known as:  Pat Colunga Take 1 Tab by mouth daily as needed for Constipation. THERA tablet Generic drug:  therapeutic multivitamin Take 1 Tab by mouth daily. triamterene-hydroCHLOROthiazide 37.5-25 mg per tablet Commonly known as:  Alejandrina Chepe Take 1 Tab by mouth daily. XARELTO 15 mg Tab tablet Generic drug:  rivaroxaban Take 15 mg by mouth daily (with lunch). ZANTAC 75 75 mg tablet Generic drug:  raNITIdine Take 75 mg by mouth Daily (before breakfast). Follow-up Instructions Return in about 4 months (around 10/26/2017) for F/U general condition. Patient Instructions Eat a well rounded diet. Use your walker for ambulation. Continue your current medications. Introducing [facility]! [facility] introduces [employer] patient portal. Now you can access parts of your medical record, email your doctor's office, and request medication refills online.    
 
1. In your internet browser, go to [email or web address] 2. Click on the First Time User? Click Here link in the Sign In box. You will see the New Member Sign Up page. 3. Enter your [employer] Access Code exactly as it appears below. You will not need to use this code after youve completed the sign-up process. If you do not sign up before the expiration date, you must request a new code. · [employer] Access Code: [email or web address] Expires: 9/24/2017 10:45 AM 
 
4. Enter the last four digits of your Social Security Number (xxxx) and Date of Birth (mm/dd/yyyy) as indicated and click Submit. You will be taken to the next sign-up page. 5. Create a [employer] ID. This will be your Grovo login ID and cannot be changed, so think of one that is secure and easy to remember. 6. Create a [employer] password. You can change your password at any time. 7. Enter your Password Reset Question and Answer. This can be used at a later time if you forget your password. 8. Enter your e-mail address. You will receive e-mail notification when new information is available in [address] 9. Click Sign Up. You can now view and download portions of your medical record. 10. Click the Download Summary menu link to download a portable copy of your medical information. If you have questions, please visit the Frequently Asked Questions section of the [employer] website. Remember, [employer] is NOT to be used for urgent needs. For medical emergencies, dial 911. Now available from your iPhone and Android! Please provide this summary of care documentation to your next provider. Your primary care clinician is listed as Magalys Santillan. If you have any questions after today's visit, please call [phone].

## 2017-06-26 NOTE — PROGRESS NOTES
Chief Complaint   Patient presents with    Hypertension     4 mth     Reviewed record in preparation for visit and have obtained necessary documentation. Identified pt with two pt identifiers(name and ). Health Maintenance Due   Topic    DTaP/Tdap/Td series (1 - Tdap)    MEDICARE YEARLY EXAM     GLAUCOMA SCREENING Q2Y          Chief Complaint   Patient presents with    Hypertension     4 mth        Wt Readings from Last 3 Encounters:   17 149 lb 8 oz (67.8 kg)   17 156 lb 6.4 oz (70.9 kg)   17 167 lb 5.3 oz (75.9 kg)     Temp Readings from Last 3 Encounters:   17 96.4 °F (35.8 °C) (Axillary)   17 96 °F (35.6 °C) (Oral)   17 96 °F (35.6 °C)     BP Readings from Last 3 Encounters:   17 110/60   17 140/60   17 140/57     Pulse Readings from Last 3 Encounters:   17 (!) 59   17 62   17 95           Learning Assessment:  :     Learning Assessment 3/26/2015 2013   PRIMARY LEARNER Patient Patient   HIGHEST LEVEL OF EDUCATION - PRIMARY LEARNER  > 4 YEARS OF COLLEGE -   BARRIERS PRIMARY LEARNER NONE -   CO-LEARNER CAREGIVER No -   PRIMARY LANGUAGE ENGLISH ENGLISH    NEED No -   LEARNER PREFERENCE PRIMARY DEMONSTRATION READING     READING -     LISTENING -   LEARNING SPECIAL TOPICS no -   ANSWERED BY patient patient   RELATIONSHIP SELF SELF       Depression Screening:  :     PHQ over the last two weeks 2016   PHQ Not Done -   Little interest or pleasure in doing things Not at all   Feeling down, depressed or hopeless Not at all   Total Score PHQ 2 0       Fall Risk Assessment:  :     Fall Risk Assessment, last 12 mths 2017   Able to walk? Yes   Fall in past 12 months? No   Fall with injury? -   Number of falls in past 12 months -   Fall Risk Score -       Abuse Screening:  :     Abuse Screening Questionnaire 3/26/2015   Do you ever feel afraid of your partner?  (No Data)   Are you in a relationship with someone who physically or mentally threatens you? (No Data)   Is it safe for you to go home? (No Data)       Coordination of Care Questionnaire:  :     1) Have you been to an emergency room, urgent care clinic since your last visit? no   Hospitalized since your last visit? no             2) Have you seen or consulted any other health care providers outside of [address] [place] [address] since your last visit? no  (Include any pap smears or colon screenings in this section.)    3) Do you have an Advance Directive on file? yes    4) Are you interested in receiving information on Advance Directives? NO      Patient is accompanied by daughter. I have received verbal consent from Hayden Nava to discuss any/all medical information while they are present in the room. Reviewed record  In preparation for visit and have obtained necessary documentation.

## 2017-06-26 NOTE — PROGRESS NOTES
Subjective:     Chief Complaint   Patient presents with    Hypertension     4 mth     She  is a 80y.o. year old female who presents for evaluation. Appetite and energy is good. Sleep is good. Bowels are OK. Historical Data    Past Medical History:   Diagnosis Date    A-fib ([address]     Dementia     GERD (gastroesophageal reflux disease)     Hypertension     Osteoporosis         Past Surgical History:   Procedure Laterality Date    HX HEENT      Tonsillectomy    HX MALIGNANT SKIN LESION EXCISION  2015    basal cell left forehead - [facility]  2007    R TKR - Dr. Tiffanie Nicole HX ORTHOPAEDIC  2004    L TKR    HX ORTHOPAEDIC  2014    partial left hip replacement        Outpatient Encounter Prescriptions as of 6/26/2017   Medication Sig Dispense Refill    OTHER Indications: herminio hose kve at bedtimeee hi. apply tp legs in the morning.  levothyroxine (SYNTHROID) 25 mcg tablet Take 1 Tab by mouth Daily (before breakfast). 30 Tab 0    alendronate (FOSAMAX) 70 mg tablet Take 70 mg by mouth every Monday.  dilTIAZem CD (CARDIZEM CD) 120 mg ER capsule Take 120 mg by mouth daily.  rivaroxaban (XARELTO) 15 mg tab tablet Take 15 mg by mouth daily (with lunch).  therapeutic multivitamin (THERA) tablet Take 1 Tab by mouth daily.  raNITIdine (ZANTAC 75) 75 mg tablet Take 75 mg by mouth Daily (before breakfast).  acetaminophen (TYLENOL) 325 mg tablet Take 650 mg by mouth every six (6) hours as needed for Pain.  senna-docusate (PERICOLACE) 8.6-50 mg per tablet Take 1 Tab by mouth daily as needed for Constipation.  triamterene-hydrochlorothiazide (MAXZIDE) 37.5-25 mg per tablet Take 1 Tab by mouth daily.  calcium-vitamin D (CALCIUM 500+D) 500 mg(1,250mg) -200 unit per tablet Take 1 Tab by mouth two (2) times daily (with meals).  loperamide (IMODIUM) 2 mg capsule Take 2 mg by mouth every eight (8) hours as needed for Diarrhea.        No facility-administered encounter medications on file as of 6/26/2017. Allergies   Allergen Reactions    Codeine Other (comments)     Dizziness    Keflex [Cephalexin] Rash        Social History     Social History    Marital status:      Spouse name: N/A    Number of children: N/A    Years of education: N/A     Occupational History    Not on file. Social History Main Topics    Smoking status: Former Smoker     Quit date: 1/15/1964    Smokeless tobacco: Never Used    Alcohol use No    Drug use: No    Sexual activity: No     Other Topics Concern    Not on file     Social History Narrative        Review of Systems  A comprehensive review of systems was negative except for that written in the HPI. Objective:     Vitals:    06/26/17 1020   BP: 110/60   Pulse: (!) 59   Resp: 18   Temp: 96.4 °F (35.8 °C)   TempSrc: Axillary   SpO2: 98%   Weight: 149 lb 8 oz (67.8 kg)   Height: 5' 3\" (1.6 m)     Skin: Warm with normal turgor. No macules, papules or stria. HEENT:    Head:  Normocephalic, atraumatic   Throat: Clear. No exudates. Neck: Supple. No masses. No adenopathy. Cardiac: Regular rate and rhythm. No murmurs, gallops or rubs. Lungs: Clear to ausculation. No wheezes, rhonchi or rales. Abdomen: Soft and non-tender. No HSM. Extremities: Pulses intact. 1-2+ edema  Musculoskeletal: No joint effusions. Neurologic:    Motor:  Symmetric and full. ASSESSMENT / PLAN:   1. Routine general medical examination at a health care facility  · Wellness review completed. 2. Screening for alcoholism      3. Paroxysmal atrial fibrillation (HCC)  · On OAC and rate control. 4. Essential hypertension  · Controlled on current medical regimen. 5. Depression with anxiety  · Stable off of Rx    6. Mild cognitive impairment with memory loss  · Supportive environment. Patient Instructions   Eat a well rounded diet. Use your walker for ambulation. Continue your current medications. Follow-up Disposition:  Return in about 4 months (around 10/26/2017) for F/U general condition. Advised her to call back or return to office if symptoms worsen/change/persist.  Discussed expected course/resolution/complications of diagnosis in detail with patient. Medication risks/benefits/costs/interactions/alternatives discussed with patient. She was given an after visit summary which includes diagnoses, current medications, & vitals. She expressed understanding with the diagnosis and plan. This is a Subsequent Medicare Annual Wellness Visit providing Personalized Prevention Plan Services (PPPS) (Performed 12 months after initial AWV and PPPS )    I have reviewed the patient's medical history in detail and updated the computerized patient record. History     Past Medical History:   Diagnosis Date    A-fib ([address]     Dementia     GERD (gastroesophageal reflux disease)     Hypertension     Osteoporosis       Past Surgical History:   Procedure Laterality Date    HX HEENT      Tonsillectomy    HX MALIGNANT SKIN LESION EXCISION  2015    basal cell left forehead - [facility]  2007    R TKR - Dr. Lorelei Brown HX ORTHOPAEDIC  2004    L TKR    HX ORTHOPAEDIC  2014    partial left hip replacement     Current Outpatient Prescriptions   Medication Sig Dispense Refill    OTHER Indications: herminio wolfe kve at bedtimeee hi. apply tp legs in the morning.  levothyroxine (SYNTHROID) 25 mcg tablet Take 1 Tab by mouth Daily (before breakfast). 30 Tab 0    alendronate (FOSAMAX) 70 mg tablet Take 70 mg by mouth every Monday.  dilTIAZem CD (CARDIZEM CD) 120 mg ER capsule Take 120 mg by mouth daily.  rivaroxaban (XARELTO) 15 mg tab tablet Take 15 mg by mouth daily (with lunch).  therapeutic multivitamin (THERA) tablet Take 1 Tab by mouth daily.  raNITIdine (ZANTAC 75) 75 mg tablet Take 75 mg by mouth Daily (before breakfast).       acetaminophen (TYLENOL) 325 mg tablet Take 650 mg by mouth every six (6) hours as needed for Pain.  senna-docusate (PERICOLACE) 8.6-50 mg per tablet Take 1 Tab by mouth daily as needed for Constipation.  triamterene-hydrochlorothiazide (MAXZIDE) 37.5-25 mg per tablet Take 1 Tab by mouth daily.  calcium-vitamin D (CALCIUM 500+D) 500 mg(1,250mg) -200 unit per tablet Take 1 Tab by mouth two (2) times daily (with meals).  loperamide (IMODIUM) 2 mg capsule Take 2 mg by mouth every eight (8) hours as needed for Diarrhea. Allergies   Allergen Reactions    Codeine Other (comments)     Dizziness    Keflex [Cephalexin] Rash     Family History   Problem Relation Age of Onset    Dementia Mother     Heart Disease Father     Lung Disease Brother     No Known Problems Daughter     No Known Problems Son     No Known Problems Son      Social History   Substance Use Topics    Smoking status: Former Smoker     Quit date: 1/15/1964    Smokeless tobacco: Never Used    Alcohol use No     Patient Active Problem List   Diagnosis Code    Essential hypertension I10    Osteoporosis M81.0    Depression with anxiety F41.8    Paroxysmal atrial fibrillation (HCC) I48.0    ACP (advance care planning) Z71.89    Venous insufficiency of both lower extremities I87.2    Mild cognitive impairment with memory loss G31.84    UTI (urinary tract infection) N39.0       Depression Risk Factor Screening:     PHQ over the last two weeks 5/26/2016   PHQ Not Done -   Little interest or pleasure in doing things Not at all   Feeling down, depressed or hopeless Not at all   Total Score PHQ 2 0     Alcohol Risk Factor Screening: On any occasion during the past 3 months, have you had more than 3 drinks containing alcohol? No    Do you average more than 7 drinks per week? No        Functional Ability and Level of Safety:     Hearing Loss   mild    Activities of Daily Living   Self-care.    Requires assistance with: no ADLs except for dressing and toileting. Fall Risk   Fall Risk Assessment, last 12 mths 6/26/2017   Able to walk? Yes   Fall in past 12 months? No   Fall with injury? -   Number of falls in past 12 months -   Fall Risk Score -     Abuse Screen   Patient is not abused    Review of Systems   A comprehensive review of systems was negative except for that written in the HPI. Physical Examination     Evaluation of Cognitive Function:  Mood/affect:  neutral  Appearance: age appropriate and casually dressed  Family member/caregiver input: daughter    Visit Vitals    /60 (BP 1 Location: Left arm, BP Patient Position: Sitting)    Pulse (!) 59    Temp 96.4 °F (35.8 °C) (Axillary)    Resp 18    Ht 5' 3\" (1.6 m)    Wt 149 lb 8 oz (67.8 kg)    SpO2 98%    BMI 26.48 kg/m2     General appearance: alert, cooperative, no distress, appears stated age  Head: Normocephalic, without obvious abnormality, atraumatic  Neck: supple, symmetrical, trachea midline, no adenopathy and thyroid: not enlarged, symmetric, no tenderness/mass/nodules  Lungs: clear to auscultation bilaterally  Heart: regular rate and rhythm, S1, S2 normal, no murmur, click, rub or gallop  Abdomen: soft, non-tender. Bowel sounds normal. No masses,  no organomegaly  Extremities: edema 2+  Skin: Skin color, texture, turgor normal. No rashes or lesions  Neurologic: Grossly normal    Patient Care Team:  Corrinne Banning, MD as PCP - General (Internal Medicine)  Sydnie Barbosa MD (Dermatology)  Jose Manuel Funes MD (Cardiology)  Francesca Ureña RN as Ambulatory Care Navigator    Advice/Referrals/Counseling   Education and counseling provided:  Are appropriate based on today's review and evaluation  End-of-Life planning (with patient's consent)      Assessment/Plan       ICD-10-CM ICD-9-CM    1. Routine general medical examination at a health care facility Z00.00 V70.0    2. Screening for alcoholism Z13.89 V79.1      Encounter Diagnoses   Name Primary?     Routine general medical examination at a health care facility     Screening for alcoholism    .

## 2017-07-12 NOTE — TELEPHONE ENCOUNTER
Last office visit- 6/26/17  Next office visit- 10/24/17  Last Refill- 3/17/17    Requested Prescriptions     Pending Prescriptions Disp Refills    acetaminophen (TYLENOL) 325 mg tablet       Sig: Take 2 Tabs by mouth every six (6) hours as needed for Pain.

## 2017-08-30 NOTE — TELEPHONE ENCOUNTER
Last office visit- 6/26/17  Next office visit- 10/24/17  Last refill- 5/2/17    Requested Prescriptions     Pending Prescriptions Disp Refills    levothyroxine (SYNTHROID) 25 mcg tablet 30 Tab 0     Sig: Take 1 Tab by mouth Daily (before breakfast).

## 2017-09-07 NOTE — TELEPHONE ENCOUNTER
Patients daughter returned call to the office, I called patient in regards to recent lab results and informed patients daughter that her mother needs to come back in 4 weeks for lab work and to increase fluid intake. No further action.

## 2017-09-25 NOTE — PROGRESS NOTES
Signed order faxed to [facility] rehab services for evaluate and treat - speech therapy.  Confirmation received

## 2017-10-02 NOTE — PROGRESS NOTES
Signed order requesting abrasion to be monitored faxed to [facility], [place].  Confirmation received

## 2017-10-10 NOTE — TELEPHONE ENCOUNTER
Speech Therapy diet recommendation order sign per Dr Galen Garner. Faxed 10-. Confirmation received.   Late entry

## 2017-10-12 PROBLEM — A41.9 SEPSIS (HCC): Status: ACTIVE | Noted: 2017-01-01

## 2017-10-12 NOTE — ED PROVIDER NOTES
HPI Comments: 80year old female with a h/o HTN, afib, dementia, hypothyroidism, presents to the ED for abnormal labs on routine bloodwork today. Daughter states at last blood draw, pt's Cr was 2.0; Today it was found to be 3.77 and her Na was 155. Daughter denies h/o renal disease and states she was recently taken off a diuretic. She has had decreased urine output and decreased PO intake. Patient at baseline mental status. Patient alert but confused and able to deny any pain. She has a bruise to the L side of the bridge of the nose which the daughter says has been there for about a week; she suspects the patient slept in her glasses. She is on Xarelto. No other medical complaints voiced at this time. The history is provided by a relative (daughter). Past Medical History:   Diagnosis Date    A[facility] ([facility]     Dementia     GERD (gastroesophageal reflux disease)     Hypertension     Osteoporosis        Past Surgical History:   Procedure Laterality Date    HX HEENT      Tonsillectomy    HX MALIGNANT SKIN LESION EXCISION  2015    basal cell left forehead - [facility]  2007    R TKR - Dr. Silvestre Leo HX ORTHOPAEDIC  2004    L TKR    HX ORTHOPAEDIC  2014    partial left hip replacement         Family History:   Problem Relation Age of Onset    Dementia Mother     Heart Disease Father     Lung Disease Brother     No Known Problems Daughter     No Known Problems Son     No Known Problems Son        Social History     Social History    Marital status:      Spouse name: N/A    Number of children: N/A    Years of education: N/A     Occupational History    Not on file.      Social History Main Topics    Smoking status: Former Smoker     Quit date: 1/15/1964    Smokeless tobacco: Never Used    Alcohol use No    Drug use: No    Sexual activity: No     Other Topics Concern    Not on file     Social History Narrative         ALLERGIES: Codeine and Keflex [cephalexin]    Review of Systems   Unable to perform ROS: Dementia       Vitals:    10/12/17 1545   BP: 105/43   Pulse: (!) 46   Resp: 18   SpO2: 97%   Weight: 67.6 kg (149 lb)   Height: 5' 3\" (1.6 m)            Physical Exam   Constitutional: She is oriented to person, place, and time. She appears well-developed and well-nourished. No distress. Elderly and chronically ill appearing   HENT:   Head: Normocephalic and atraumatic. Right Ear: External ear normal.   Left Ear: External ear normal.   Nose: Nose normal.   Mouth/Throat: Oropharynx is clear and moist. No oropharyngeal exudate. Eyes: Conjunctivae and EOM are normal. Pupils are equal, round, and reactive to light. Neck: Normal range of motion. Neck supple. Cardiovascular: Normal rate, regular rhythm, normal heart sounds and intact distal pulses. Pulmonary/Chest: Effort normal and breath sounds normal.   Abdominal: Soft. Bowel sounds are normal. There is no tenderness. There is no rebound and no guarding. Musculoskeletal: Normal range of motion. Neurological: She is alert and oriented to person, place, and time. Skin: Skin is warm and dry. Psychiatric: She has a normal mood and affect. Her behavior is normal. Judgment and thought content normal.   Nursing note and vitals reviewed. MDM  Number of Diagnoses or Management Options  YONIS (acute kidney injury) ([facility]): Hypernatremia:   Sepsis, due to unspecified organism [facility]):   Urinary tract infection with hematuria, site unspecified:   Diagnosis management comments: Patient arrives with family (daughter) for abnormal labs (Cr and Sodium); Per daughter, pt at normal mental status on arrival. Found to be hypothermic (90.2) on arrival, placed on bear hugger, and given warm IVF. Temp improved to 94.3. Lactate, paired cultures, labs, cxr, urine  Thyroid studies added for low HR and temp.   Abx for UTI; CXR clear  Plan admission    ED Course       Procedures    6:00 PM  Spoke with Dr. Jennifer Rahman, hospitalist, discussed pt's PE, hx, labs, he will see and admit the patient. Per daughter, pt was at baseline on arrival and now less responsive than normal. Typically unable to carry on conversations, but normally more alert. Pt DNR.     6:09 PM   Zosyn added per request by Dr. Renata Leigh, ED attending.

## 2017-10-12 NOTE — PROGRESS NOTES
Spoke to the patient's daughter at bedside. Does not want the patient to go to ICU. Is fine with IV fluids and IV antibiotics for now. Waiting for her brother to come and see the patient. Also wants to see the hospice.

## 2017-10-12 NOTE — Clinical Note
Status[de-identified] Inpatient [101] Type of Bed: Intensive Care [6] Inpatient Hospitalization Certified Necessary for the Following Reasons: 4. Patient requires ICU level of care interventions (further clarification in H&P documentation) Admitting Diagnosis: Sepsis ([facility] [address] [[id number]] Admitting Physician: Red Wade [[id number]] Attending Physician: Marisela Cantrell Estimated Length of Stay: 2 Midnights Discharge Plan[de-identified] Home with Office Follow-up

## 2017-10-12 NOTE — ED TRIAGE NOTES
Patient comes to the ER via EMS from [place] at [facility]) for abnormal labs.  Increased Na 155, Creatinine 3.77,

## 2017-10-12 NOTE — H&P
[address]   [address]   HISTORY AND PHYSICAL       Name:  Nikkie Cazares   MR#:  [id number]   :  1924   Account #:  [de-identified]        Date of Adm:  10/12/2017       ADMITTED: 10/12/2017 at 6:30 p.m. ATTENDING: Heri Madrigal MD     PRIMARY CARE PHYSICIAN: Alejo Judd MD    CHIEF COMPLAINT: \"Dehydration\". HISTORY OF PRESENT ILLNESS: This is a 19-year-old female with   past medical history of hypertension, atrial fibrillation on Xarelto,   advanced dementia, hypothyroidism. She was sent from the nursing   facility because of abnormal blood work. This full history is as per the   patient's daughter who is sitting by the patient. I was not able to gather   any history from the patient because of altered mental state. Briefly, the patient has advanced dementia and over the last few   months, the patient has deteriorated quite significantly to the point that   since last few weeks she has declined eating or drinking anything. About 3 weeks ago, the patient had blood work done, which indicated   that she might dehydrated. At that time, she was taken off diuretic. About one week ago, another blood work was done, which showed   continued deterioration. Finally today it was decided by the facility that   she should come to the ER. As far as the daughter knows, there was no history of fever, cough,   chest pain, shortness of breath, headache, dizziness, diarrhea. No   nausea or vomiting. The last time when the daughter saw the patient   was about 4 days ago when she claimed that she tried to feed the   patient but could not. Also as per her, throughout the whole day she   might be taking 1 or 2 sips of water. REVIEW OF SYSTEMS: Pertinent positives as above. Rest of the   review of systems were done. They were all negative except for above. PAST MEDICAL HISTORY    1. Hypertension. 2. Hypothyroidism. 3. Advanced dementia. 4. GERD. 5. Atrial fibrillation. 6. Osteoporosis. PAST SURGERIES    1. Tonsillectomy. 2. Partial left hip replacement was put in. FAMILY HISTORY: Significant for dementia, coronary artery disease. SOCIAL HISTORY: Former smoker, quit in 1964. Nonalcoholic,   nondrug abuser. ALLERGIES   1. CODEINE. 2. Nunez Rodriguez. HOME MEDICATIONS: The patient is on the following medications:   1. Tylenol 650 q.6 hours p.r.n.   2. Fosamax 70 mg p.o. every Monday. 3. Cardizem- mg p.o. daily. 4. Synthroid 25 mcg p.o. daily. 5. Imodium 2 mg q.8 hours p.r.n.   6. Zantac 75 mg p.o. daily. 7. Xarelto 15 mg p.o. daily. 8. Multivitamin 1 p.o. daily. 9. Maxzide 37.5/25 one p.o. daily. PHYSICAL EXAMINATION   VITAL SIGNS: At the time the patient was seen, the patient's vitals   were as follows: Blood pressure 99/38, pulse 47, temperature 94.3,   respiratory rate 10, saturating 98% on room air. HEAD: Normocephalic, atraumatic. EYES: PERRLA, EOMI. EARS: Bilateral hearing normal. No growth. NOSE: No polyps, no bleeding. MOUTH: Dry mucous membranes. NECK: Supple, no JVD, no thyromegaly. RESPIRATORY: Clear to auscultation bilaterally. No adventitious   breath sounds. CARDIOVASCULAR: Regular rate and rhythm. No murmurs, rubs, or   gallops. GASTROINTESTINAL: Bowel sounds present. Soft, nontender,   nondistended. NEUROLOGICAL/PSYCHIATRIC: The patient is quite drowsy and   confused and agitated. I was not able to do a good motor or sensory   examination. LABORATORY DATA: WBC 8.6, hemoglobin 12.0, hematocrit 37,   platelet count 620. Urinalysis shows large leukocyte esterase, positive   nitrites, 0-100 WBCs and 4+ bacteria. INR 1.3. Sodium 150, potassium   was not able to gather, chloride 119, bicarbonate 26, BUN 27,   creatinine 4.1. Lactic acid of 1.5. Blood culture and urine culture have   already been sent. X-ray of the chest was done, which did not show any acute   cardiopulmonary process. EKG showed sinus bradycardia with first-degree AV block. ASSESSMENT AND PLAN: This is a 80-year-old  female   with a past medical history of hypertension, chronic atrial fibrillation on   Xarelto, advanced dementia, and gastroesophageal reflux disease. She comes over here because of worsening labs and is found to be   dehydrated and having urinary tract infection. 1.  Dehydration with acute renal failure. I will put the patient on IV fluids. 2.  Urinary tract infection. The patient has already been started on   Zosyn and Levaquin. I would just continue Zosyn for now and   follow the cultures. Currently, I have spoken with the patient's   daughter. She does not want the patient to go to the ICU even if the   blood pressures are low. She just wants to make the patient   COMFORT CARE. She is waiting for her brother to come and see the   patient. Until then she wants to continue the IV fluids and IV antibiotics. We will do that. 2. Hypothermia secondary to sepsis above. We would request to have   a [employer] on the medical floor if possible. I have already submitted   a message to the nursing supervisor. 3. Chronic atrial fibrillation. The patient is on Xarelto at the nursing   facility. We would hold that for now. 4. Advanced dementia. As mentioned above, the patient's daughter,   who is the medical power of , she has already made up her   mind to make the patient COMFORT CARE. We will consult the   hospice regarding the same. I spent about 45 minutes in taking care of the patient.         Josemanuel Pfeiffer MD      PG / MS   D:  10/12/2017   18:51   T:  10/12/2017   19:32   Job #:  [id number]

## 2017-10-12 NOTE — IP AVS SNAPSHOT
[address] 
[phone] Patient: Sophy Hedrick MRN: [id number] :1924 You are allergic to the following Allergen Reactions Codeine Other (comments) Dizziness Keflex (Cephalexin) Rash Recent Documentation Height Weight BMI OB Status Smoking Status 1.6 m 67.6 kg 26.39 kg/m2 Postmenopausal Former Smoker Unresulted Labs Order Current Status CULTURE, BLOOD, PAIRED Preliminary result Emergency Contacts  (Rel.) Home Phone Work Phone Mobile Phone Isabel Richardson [phone] -- [phone] [facility] 9462 (Child) [phone] -- -- About your hospitalization You were admitted on:  2017 You last received care in the:  [facility] You were discharged on:  2017 Why you were hospitalized Your primary diagnosis was:  Sepsis ([facility]) Providers Seen During Your Hospitalizations Provider Role Specialty Primary office phone Betzaida Raza MD Attending Provider Emergency Medicine [phone] Cayetano Garcia MD Attending Provider Internal Medicine [phone] Treasure Frias MD Attending Provider Internal Medicine [phone] Your Primary Care Physician (PCP) Primary Care Physician Office Phone Office Fax Sangitabereniceivette Pfeiffer [phone] Follow-up Information Follow up With Details Comments Contact Info Abhishek Vidal MD   [address] [place] [address] [address] [facility] Frederic Jimenez 
[phone] Appointments for Next 14 days 10/24/2017 10:00 AM  [address] [facility] Current Discharge Medication List  
  
STOP taking these medications   
 acetaminophen 325 mg tablet Commonly known as:  TYLENOL  
   
  
 CARDIZEM  mg ER capsule Generic drug:  dilTIAZem CD  
 FOSAMAX 70 mg tablet Generic drug:  alendronate  
   
  
 levothyroxine 25 mcg tablet Commonly known as:  SYNTHROID  
   
  
 loperamide 2 mg capsule Commonly known as:  IMODIUM  
   
  
 OTHER  
   
  
 senna-docusate 8.6-50 mg per tablet Commonly known as:  Vertie Varun THERA tablet Generic drug:  therapeutic multivitamin XARELTO 15 mg Tab tablet Generic drug:  rivaroxaban ZANTAC 75 75 mg tablet Generic drug:  raNITIdine Discharge Instructions Discharge Instructions PATIENT ID: Brenda Weber MRN: [id number] YOB: 1924 DATE OF ADMISSION: 10/12/2017  3:35 PM   
DATE OF DISCHARGE: 10/14/2017 PRIMARY CARE PROVIDER: Valentino Bourgeois MD  
 
ATTENDING PHYSICIAN: Troy Ward MD 
DISCHARGING PROVIDER: Troy Ward MD   
To contact this individual call [phone] and ask the  to page. If unavailable ask to be transferred the Adult Hospitalist Department. DISCHARGE DIAGNOSES Severe sepsis due to UTI POA Hypothermia likely due to sepsis YONIS dehydration due to poor oral intake Hypernatremia due to dehydration Encephalopathy metabolic POA Chronic A Fib, now bradycardic Advanced dementia Hypothyroidism Left hip eschar CONSULTATIONS: IP CONSULT TO HOSPITALIST 
 
PROCEDURES/SURGERIES: * No surgery found * PENDING TEST RESULTS:  
At the time of discharge the following test results are still pending: none FOLLOW UP APPOINTMENTS:  
Hospice Care ADDITIONAL CARE RECOMMENDATIONS:  
 
DIET: As tolerated ACTIVITY: Bedrest 
 
WOUND CARE: none EQUIPMENT needed: none DISCHARGE MEDICATIONS: 
 See Medication Reconciliation Form DISPOSITION: 
  Home With: 
 OT  PT  [facility]  RN  
  
 SNF/Inpatient Rehab/LTAC Independent/assisted living  
x Hospice Other: CDMP Checked:  
Yes x PROBLEM LIST Updated: 
Yes x Signed:  
Troy Ward MD 
 10/14/2017 
1:59 PM 
 
Discharge Orders None ACO Transitions of Care Introducing [employer] [address] Marianne Mayes offers a voluntary care coordination program to provide high quality service and care to [facility] fee-for-service beneficiaries. Maria Esther Cuevas was designed to help you enhance your health and well-being through the following services: ? Transitions of Care  support for individuals who are transitioning from one care setting to another (example: Hospital to home). ? Chronic and Complex Care Coordination  support for individuals and caregivers of those with serious or chronic illnesses or with more than one chronic (ongoing) condition and those who take a number of different medications. If you meet specific medical criteria, a [facility] [address] may call you directly to coordinate your care with your primary care physician and your other care providers. For questions about the [facility] programs, please, contact your physicians office. For general questions or additional information about Accountable Care Organizations: 
Please visit www.medicare.gov/acos. html or call 1-800-MEDICARE (1-887.930.6434) TTY users should call 0-946.198.1362. [employer] Announcement We are excited to announce that we are making your provider's discharge notes available to you in [employer]. You will see these notes when they are completed and signed by the physician that discharged you from your recent hospital stay. If you have any questions or concerns about any information you see in [employer], please call the Health Information Department where you were seen or reach out to your Primary Care Provider for more information about your plan of care. Introducing [facility]!    
 [employer] introduces [employer] patient portal. Now you can access parts of your medical record, email your doctor's office, and request medication refills online. 1. In your internet browser, go to [email or web address] 2. Click on the First Time User? Click Here link in the Sign In box. You will see the New Member Sign Up page. 3. Enter your [employer] Access Code exactly as it appears below. You will not need to use this code after youve completed the sign-up process. If you do not sign up before the expiration date, you must request a new code. · [employer] Access Code: [email or web address] Expires: 1/12/2018  2:03 PM 
 
4. Enter the last four digits of your Social Security Number (xxxx) and Date of Birth (mm/dd/yyyy) as indicated and click Submit. You will be taken to the next sign-up page. 5. Create a [employer] ID. This will be your [employer] login ID and cannot be changed, so think of one that is secure and easy to remember. 6. Create a [employer] password. You can change your password at any time. 7. Enter your Password Reset Question and Answer. This can be used at a later time if you forget your password. 8. Enter your e-mail address. You will receive e-mail notification when new information is available in [address] 9. Click Sign Up. You can now view and download portions of your medical record. 10. Click the Download Summary menu link to download a portable copy of your medical information. If you have questions, please visit the Frequently Asked Questions section of the [employer] website. Remember, [employer] is NOT to be used for urgent needs. For medical emergencies, dial 911. Now available from your iPhone and Android! General Information Please provide this summary of care documentation to your next provider. Patient Signature:  ____________________________________________________________ Date:  ____________________________________________________________  
  
Lele Mais  Provider Signature: ____________________________________________________________ Date:  ____________________________________________________________

## 2017-10-12 NOTE — ED NOTES
BP in 70's. Hung extra bag fluid. Dr. Donato Marker in to assess pt and talk with family about their wishes.

## 2017-10-13 NOTE — PROGRESS NOTES
10/13/17 1013   Vital Signs   Temp (!) 92.2 °F (33.4 °C)   Temp Source Axillary   Pulse (Heart Rate) (!) 57   Heart Rate Source Monitor   Resp Rate 16   O2 Sat (%) 96 %   Level of Consciousness Alert   BP (!) 73/36   MAP (Calculated) (!) 48   BP 1 Method Automatic   BP 1 Location Left arm   BP Patient Position At rest   MEWS Score 5     MEWS 5, pt comfort measures, MD aware of BP and temp.

## 2017-10-13 NOTE — CDMP QUERY
#1    Please clarify if this patient is being treated/managed for:    =>Hypernatremia in the setting of Dehydration requiring lab monitoring/IV fluids  =>Other Explanation of clinical findings  =>Unable to Determine (no explanation of clinical findings)    The medical record reflects the following clinical findings, treatment, and risk factors:    Risk Factors: dehydration  Clinical Indicators: Na 150  Treatment: lab monitoring/IV fluids    Please clarify and document your clinical opinion in the progress notes and discharge summary including the definitive and/or presumptive diagnosis, (suspected or probable), related to the above clinical findings. Please include clinical findings supporting your diagnosis.     Thank you,         Yonatan Miranda [facility]

## 2017-10-13 NOTE — PROGRESS NOTES
Hospitalist Progress Note  Anjana Plasencia MD  Answering service: [phone] from in house phone  Cell: [phone]      Date of Service:  10/13/2017  NAME:  Scarlet Tabares  :  1924  MRN:  [id number]      Admission Summary: This is a 80-year-old female with past medical history of hypertension, atrial fibrillation on Xarelto, advanced dementia, hypothyroidism. She was sent from the nursing facility because of abnormal blood work. This full history is as per the patient's daughter who is sitting by the patient. I was not able to gather any history from the patient because of altered mental state. Briefly, the patient has advanced dementia and over the last few months, the patient has deteriorated quite significantly to the point that since last few weeks she has declined eating or drinking anything. About 3 weeks ago, the patient had blood work done, which indicated that she might dehydrated. At that time, she was taken off diuretic. About one week ago, another blood work was done, which showed continued deterioration.  Finally today it was decided by the facility that she should come to the ER.        Interval history / Subjective:   No pain, patient looks comfortable     Assessment & Plan:     Severe sepsis due to UTI POA  -hypotensive, hypothermic, bradycardic, renal failure  -on zosyn and IVF  -had a long discussion with patient daughter at bedside, requested to discontinue IVF, IV antibiotics and transitioned to only comfort care, she is awaiting her brother who is coming this afternoon.   -lorazepam and morphine iv prn added  -hospice care on board    Hypothermia likely due to sepsis  -still hypothermic, on Braydon Hugger  -supportive care    YONIS dehydration due to poor oral intake  -on IVF D5W, discontinue on 10/13    Hypernatremia due to dehydration  -has received iv fluids, now discontinued per daughter request    Encephalopathy metabolic POA  -improved, patient conscious and alert, answer simple questions    Chronic A Fib, now bradycardic  -diltiazem and xarelto is held    Advanced dementia   -conscious and alert, answer simple questions  -continue supportive care    Hypothyroidism   -synthroid discontinued    Left hip eschar   -wound care nurse on board    DNR, poor prognosis, now on comfort care only        Code status: DNR  DVT prophylaxis: SCD    Care Plan discussed with: Patient/Family, Nurse and   Disposition: From [address] at [facility] Problems  Date Reviewed: 6/26/2017          Codes Class Noted POA    * (Principal)Sepsis ([facility] [address] ICD-10-CM: A41.9  ICD-9-CM: 038.9, 995.91  10/12/2017 Unknown                Vital Signs:    Last 24hrs VS reviewed since prior progress note. Most recent are:  Visit Vitals    BP 92/59 (BP 1 Location: Left arm, BP Patient Position: At rest)    Pulse 65    Temp (!) 93.9 °F (34.4 °C)    Resp 16    Ht 5' 3\" (1.6 m)    Wt 67.6 kg (149 lb)    SpO2 95%    BMI 26.39 kg/m2       No intake or output data in the 24 hours ending 10/13/17 0948     Physical Examination:             Constitutional:  No acute distress, cooperative, pleasant    ENT:  Oral mucous moist, oropharynx benign. Neck supple,    Resp:  Decrease bronchial breath sound bilaterally. No wheezing/rhonchi/rales. No accessory muscle use   CV:  Regular rhythm, normal rate, no murmurs, gallops, rubs    GI:  Soft, non distended, non tender.  normoactive bowel sounds, no hepatosplenomegaly     Musculoskeletal:  No edema,    Neurologic:  Conscious and alert, answer simple questions, moves all extremities      Skin:  left hip ischar       Data Review:    Review and/or order of clinical lab test      Labs:     Recent Labs      10/12/17   1604   WBC  8.6   HGB  12.0   HCT  37.8   PLT  195     Recent Labs      10/12/17   1604   NA  150*   K  HEMOLYZED,RECOLLECT REQUESTED   CL 119*   CO2  26   BUN  107*   CREA  4.11*   GLU  89   CA  10.1     Recent Labs      10/12/17   1604   SGOT  HEMOLYZED,RECOLLECT REQUESTED   ALT  65   AP  158*   TBILI  0.8   TP  7.6   ALB  2.4*   GLOB  5.2*     Recent Labs      10/12/17   1604   INR  1.3*   PTP  12.7*      No results for input(s): FE, TIBC, PSAT, FERR in the last 72 hours. No results found for: FOL, RBCF   No results for input(s): PH, PCO2, PO2 in the last 72 hours. No results for input(s): CPK, CKNDX, TROIQ in the last 72 hours.     No lab exists for component: CPKMB  No results found for: CHOL, CHOLX, CHLST, CHOLV, HDL, LDL, LDLC, DLDLP, TGLX, TRIGL, TRIGP, CHHD, CHHDX  Lab Results   Component Value Date/Time    Glucose (POC) 92 03/17/2017 06:29 PM    Glucose (POC) 144 08/06/2014 05:02 PM    Glucose (POC) 135 08/03/2014 05:10 PM     Lab Results   Component Value Date/Time    Color YELLOW 10/12/2017 04:04 PM    Appearance TURBID 10/12/2017 04:04 PM    Specific gravity 1.016 10/12/2017 04:04 PM    pH (UA) 6.0 10/12/2017 04:04 PM    Protein 100 10/12/2017 04:04 PM    Glucose NEGATIVE  10/12/2017 04:04 PM    Ketone NEGATIVE  10/12/2017 04:04 PM    Bilirubin NEGATIVE  10/12/2017 04:04 PM    Urobilinogen 0.2 10/12/2017 04:04 PM    Nitrites POSITIVE 10/12/2017 04:04 PM    Leukocyte Esterase LARGE 10/12/2017 04:04 PM    Epithelial cells FEW 10/12/2017 04:04 PM    Bacteria 4+ 10/12/2017 04:04 PM    WBC >100 10/12/2017 04:04 PM    RBC  10/12/2017 04:04 PM         Medications Reviewed:     Current Facility-Administered Medications   Medication Dose Route Frequency    therapeutic multivitamin (THERAGRAN) tablet 1 Tab  1 Tab Oral DAILY    loperamide (IMODIUM) capsule 2 mg  2 mg Oral Q8H PRN    senna-docusate (PERICOLACE) 8.6-50 mg per tablet 1 Tab  1 Tab Oral DAILY PRN    acetaminophen (TYLENOL) tablet 650 mg  650 mg Oral Q6H PRN    levothyroxine (SYNTHROID) tablet 25 mcg  25 mcg Oral ACB    sodium chloride (NS) flush 5-10 mL  5-10 mL IntraVENous Q8H    sodium chloride (NS) flush 5-10 mL  5-10 mL IntraVENous PRN    dextrose 5 % - 0.2% NaCl 1,000 mL with sodium bicarbonate (8.4%) 75 mEq infusion   IntraVENous CONTINUOUS    piperacillin-tazobactam (ZOSYN) 3.375 g in 0.9% sodium chloride (MBP/ADV) 100 mL  3.375 g IntraVENous Q12H    ondansetron (ZOFRAN) injection 4 mg  4 mg IntraVENous Q4H PRN     ______________________________________________________________________  EXPECTED LENGTH OF STAY: - - -  ACTUAL LENGTH OF STAY:          1                 Uma Hussein MD

## 2017-10-13 NOTE — HOSPICE
[facility]  [phone]     Patient Name: Tessa Silva  YOB: 1924  Age: 80 y.o. [facility] RN Note:  Hospice consult received, reviewing chart. Will follow up with Unit Nurse and Care Manager to discuss plan of care, patient status and discharge disposition within the hour. Thank you for the opportunity to be of service to this patient.     Jacek Go RN [facility]

## 2017-10-13 NOTE — PROGRESS NOTES
Nurse Gama Bailey RN and Vernon Bartlett RN performed a dual skin assessment on this patient Impairment noted-   Brown score is 11    Right arm skin tear--left hip scabbed wound--multiple bruising    Wound care consult

## 2017-10-13 NOTE — PROGRESS NOTES
Zosyn dose adjustment  Indication:  UTI  Current regimen:  3.375 gm IV Q8H  Abx regimen: monotherapy  Recent Labs      10/12/17   1604   WBC  8.6   CREA  4.11*   BUN  107*     Est CrCl: ~8 ml/min  Temp (24hrs), Av.8 °F (34.3 °C), Min:92.7 °F (33.7 °C), Max:94.3 °F (34.6 °C)    Cultures: 10/12 blood pending  10/12 urine pending    Plan: Change to 3.375 gm IV Q12H for pt with CrCl <20 ml/min

## 2017-10-13 NOTE — PROGRESS NOTES
Chart Reviewed:  CM met with patient and patient's daughter, Herman Berry [phone] at bedside. Patient resting. Patient unable to complete assessment due to advanced dementia. CM completed CM assessment with patient's daughter. CM verified demographics, PCP, and insurance coverage. Patient is a 80year old female with an admitting diagnosis of Sepsis. Patient's medical history consist of the following:  hypertension, atrial fibrillation on Xarelto, advanced dementia, hypothyroidism. Current Living Situation:  Patient is a resident at [facility] the [facility] located on [address] [place] [address] [phone] resides in the memory care unit. Patient receives assistance with all ADL's and IADL's. Medications are given to patient at facility. Daughter reports no financial stressors or concerns at this time. Mode of transport at time of discharge TBD. Discharge Planning:  CM received a consult for Hospice. Patient's family provided with freedom of choice. Family has selected [facility]. CM submitted a referral via [employer] [facility] to [employer] and is awaiting review and authorization for services. CM will continue to follow and assist with disposition needs as they arise. Care Management Interventions  PCP Verified by CM:  Yes (Last saw PCP in June)  Palliative Care Criteria Met (RRAT>21 & CHF Dx)?: No  Mode of Transport at Discharge: S  Transition of Care Consult (CM Consult): Alla: Yes  Discharge Durable Medical Equipment: No  Physical Therapy Consult: No  Occupational Therapy Consult: No  Speech Therapy Consult: No  Current Support Network: Assisted Living  Confirm Follow Up Transport: Other (see comment) (TBD: may need ambulance transport at discharge)  Plan discussed with Pt/Family/Caregiver: Yes  Freedom of Choice Offered: Yes  Discharge Location  Discharge Placement: Home with hospice    Jason Rain MSW/CRM  9:36 AM

## 2017-10-13 NOTE — PROGRESS NOTES
Problem: Falls - Risk of  Goal: *Absence of Falls  Document Carlie Fall Risk and appropriate interventions in the flowsheet.    Outcome: Progressing Towards Goal  Fall Risk Interventions:        Mentation Interventions: Bed/chair exit alarm, Door open when patient unattended, Family/sitter at bedside, More frequent rounding     Medication Interventions: Bed/chair exit alarm, Evaluate medications/consider consulting pharmacy     Elimination Interventions: Bed/chair exit alarm     History of Falls Interventions: Door open when patient unattended

## 2017-10-13 NOTE — ED NOTES
TRANSFER - OUT REPORT:    Verbal report given to HEBER Murillo(name) on Devan Malone  being transferred to 606(unit) for routine progression of care       Report consisted of patients Situation, Background, Assessment and   Recommendations(SBAR). Information from the following report(s) SBAR, ED Summary, Procedure Summary, Recent Results and Cardiac Rhythm nsr was reviewed with the receiving nurse. Lines:   Peripheral IV 10/12/17 Right Antecubital (Active)   Site Assessment Clean, dry, & intact 10/12/2017  4:04 PM   Phlebitis Assessment 0 10/12/2017  4:04 PM   Infiltration Assessment 0 10/12/2017  4:04 PM   Dressing Status Clean, dry, & intact 10/12/2017  4:04 PM   Dressing Type Transparent 10/12/2017  4:04 PM   Hub Color/Line Status Flushed;Patent 10/12/2017  4:04 PM       Peripheral IV 10/12/17 Left Antecubital (Active)   Site Assessment Clean, dry, & intact 10/12/2017  4:05 PM   Phlebitis Assessment 0 10/12/2017  4:05 PM   Infiltration Assessment 0 10/12/2017  4:05 PM   Dressing Status Clean, dry, & intact 10/12/2017  4:05 PM   Dressing Type Transparent 10/12/2017  4:05 PM   Hub Color/Line Status Flushed;Patent 10/12/2017  4:05 PM        Opportunity for questions and clarification was provided.       Patient transported with:   [employer]

## 2017-10-13 NOTE — HOSPICE
[facility] Help to Those in Need  [phone]    Patient Name: Ricky Novoa  YOB: 1924  Age: 80 y.o. [facility]TL RN Note:  Hospice consult noted. Chart reviewed. Plan of care discussed with patients nurse & care manager. Dev Mckinley MSW and this liaison were in to meet with dtr Gokul Mahmood who is an RN educator at [address] [place] [address]. Discussed Hospice philosophy, general plan of care, levels of care, services. Family information packet provided & reviewed with her. She states pt's son Mary  is on his way here today from [place]. Pt is not inpatient appropriate at this time. Will follow up later as dtr would like to speak to Dr. Laury Karimi about recent labs and renal function. We offered the [facility] for 2-3 week maximum stay. Thank you for the opportunity to be of service to this patient.     Gayland Siemens, RN [facility]

## 2017-10-13 NOTE — CDMP QUERY
This patient has been diagnosed with Sepsis. Please document in the progress notes the clinical indicators that support this diagnosis   Or state that the diagnosis has been ruled out. =>Sepsis ruled out  =>Other Explanation of clinical findings  =>Unable to Determine (no explanation of clinical findings)      Current Documentation:   WBC: 8.6, lactic acid 1.5, no tachycardia    Sepsis indicators include:   Documented Source of suspected or possible infection in addition to 2 of more of the following:   Temperature >38C or <36C    Heart Rate >90/min    Respiratory Rate >20/min or PaCO2 <32 mm Hg    WBC >12,000/mm3 or <4000/mm3 or >10% immature bands          Please clarify and document your clinical opinion in the progress notes and discharge summary including the definitive and/or presumptive diagnosis, (suspected or probable), related to the above clinical findings. Please include clinical findings supporting your diagnosis.   Thank you,         Linda Rueda [address] [place] [address]

## 2017-10-13 NOTE — PROGRESS NOTES
Problem: Discharge Planning  Goal: *Discharge to safe environment  Outcome: Progressing Towards Goal  Disposition Needs:  Plan for discharge at this time is home with Hospice. [facility] by family. CM awaiting review and authorization for services. CM will continue to follow and assist with disposition needs as they arise. Mode of transport at time of discharge TBD.      Kennard Severs, MSW/MARIO  9:39 AM

## 2017-10-13 NOTE — WOUND CARE
Wound Consult:  New Patient Visit. Chart reviewed. Consulted for left hip ulcer and right arm skin tear. Patient's daughter, Jose Alberto Saldaña is at bedside; she is RN Educator here at [facility] [employer] and provides history/care with me to mom. Mrs. Lisa Savage is alert; she has some residual confusion related to her UTI/Sepsis but is communicating. She had surgery to left hip with well healed incision but has developed what appears to be pressure injury adjacent to site. Spoke with patients nurse,  Rosa Cooper to hand off on visit/treatment. Patient is resting on a Versacare bed with accumax foam mattress. A posey pad is in place. Assessment:  Left hip - 2.2 x 1.6 x 0.1 cm area of injury - loose moist black devitalized tissue removed partially from base revealing a 90% red, base with granulation present and 10% adhered black/brown moist tissue, pink intact blanching skin surrounding; no drainage or odor is noted; without knowledge of any trauma to area would place etiology as pressure injury; Stage 3 POA. The adjacent incision is well healed thin pale white line. Right arm - 4 x 1 cm nearly resurface skin tear - pink, drying. No drainage noted. No surrounding redness. Left heel with blanching pink intact skin, no redness to right. Sacrum / buttocks areas intact, no redness. Had incontinent episode of both urine and hard stool with some loose stool surrounding. Treatment:  Incontinence skin care. Aloe vesta applied. Mepitel One to right arm skin tear. DuoDerm to left hip ulcer. Repositioned and floated heels. Wound Recommendations:  Wound Care: Cleanse left hip pressure injury with saline, pat dry and apply DuoDerm weekly. Mepitel one dressing to right arm skin tear, change as needed. Skin Care Recommendations:  1. Minimize friction/shear: minimize layers of linen/pads under patient.   2. Off load pressure/reposition: continue to turn and reposition approximately every 2 hours; float heels or use Prevalon boots; waffle cushion for sitting. 3. Manage Moisture - keep skin folds dry; incontinence skin care as needed; briefs in use for dual incontinence. 4. Continue to monitor nutrition, pain, and skin risk scale, and skin assessment. Plan:  Spoke with Dr. Edda Burleson regarding findings and obtained orders for treatment. We will continue to reassess routinely and as needed.   Abbie Riuz, [address] [facility] Office [phone]  Pager [phone]

## 2017-10-13 NOTE — PROGRESS NOTES
10/13/17 1600   Vital Signs   Temp (!) 93 °F (33.9 °C)   Temp Source Axillary   Pulse (Heart Rate) (!) 50   Heart Rate Source Monitor   Resp Rate 16   O2 Sat (%) 93 %   Level of Consciousness Alert   BP (!) 85/42   MAP (Calculated) (!) 56   BP 1 Method Automatic   BP 1 Location Left arm   BP Patient Position At rest   MEWS Score 5   comfort care

## 2017-10-13 NOTE — CDMP QUERY
Please clarify & documentation the Present on Admission (POA) status for: pressure ulcers      =>Left Hip Pressure Injury Stage 3 POA  =>Other Explanation of clinical findings  =>Unable to Determine (no explanation of clinical findings)        The medical record reflects the following clinical findings, treatment, and risk factors:    Risk Factors:dementia, dehydration  Clinical Indicators:10/13 wound care note  Left hip - 2.2 x 1.6 x 0.1 cm area of injury - loose moist black devitalized tissue removed partially from base revealing a 90% red, base with granulation present and 10% adhered black/brown moist tissue, pink intact blanching skin surrounding; no drainage or odor is noted; without knowledge of any trauma to area would place etiology as pressure injury; Stage 3 POA. The adjacent incision is well healed thin pale white line. Right arm - 4 x 1 cm nearly resurface skin tear - pink, drying. No drainage noted. No surrounding redness. Left heel with blanching pink intact skin, no redness to right. Treatment:wd care consult    Please clarify and document your clinical opinion in the progress notes and discharge summary including the definitive and/or presumptive diagnosis, (suspected or probable), related to the above clinical findings. Please include clinical findings supporting your diagnosis.     Thank you,         Shruti Aldridge [facility] Network[facility]ajeev[facility]rik

## 2017-10-13 NOTE — PROGRESS NOTES
TRANSFER - IN REPORT:    Verbal report received from [facility], RN (name) on Martine Reyna  being received from ED (unit) for routine progression of care      Report consisted of patients Situation, Background, Assessment and   Recommendations(SBAR). Information from the following report(s) SBAR, Kardex, Intake/Output, MAR, Accordion and Recent Results was reviewed with the receiving nurse. Opportunity for questions and clarification was provided. Assessment completed upon patients arrival to unit and care assumed.            2105 Pt arrived on unit

## 2017-10-13 NOTE — PROGRESS NOTES
Bedside and Verbal shift change report given to Jace (oncoming nurse) by Edward Avendaño (offgoing nurse). Report included the following information SBAR, Kardex and Intake/Output.

## 2017-10-13 NOTE — PROGRESS NOTES
Bedside shift change report given to Camilo Yun (oncoming nurse) by Melanie Fabry. (offgoing nurse). Report included the following information SBAR.     1 Notified Dr. Fredy Harvey of pt's BP 73/36 and temp of 92.2.    1435 Spoke to Dr. Fredy Harvey about pt eating for comfort. He states that is fine. Also, notified him that family would like pt to stay over night to be monitored off of IVF for 24 hours.

## 2017-10-14 NOTE — PROGRESS NOTES
10/14/17 0840   Vital Signs   Temp (!) 93.5 °F (34.2 °C)   Temp Source Axillary   Pulse (Heart Rate) (!) 56   Heart Rate Source Monitor   Resp Rate 16   O2 Sat (%) 97 %   Level of Consciousness Alert   BP 98/44   MAP (Calculated) (!) 62   BP 1 Method Automatic   BP 1 Location Left arm   BP Patient Position At rest   MEWS Score 4   Comfort care

## 2017-10-14 NOTE — PROGRESS NOTES
Writer contacted by Neris Avelar with Hospice advising that patient needed transportation for today to the [facility] on [address]. Writer arranged for transportation for today @ 2pm with [employer]. Neris Avelar notified and will contact the family. Transportation sheet is on patients chart. No other patient needs identified at this time.  Fredi Garcia LCSW

## 2017-10-14 NOTE — PROGRESS NOTES
TRANSFER - OUT REPORT:    Verbal report given to [place] (name) on Amandeep Posadas  being transferred to Hospice house(unit) for routine progression of care       Report consisted of patients Situation, Background, Assessment and   Recommendations(SBAR). Information from the following report(s) SBAR, Kardex and Intake/Output was reviewed with the receiving nurse. Lines:   Peripheral IV 10/12/17 Right Antecubital (Active)   Site Assessment Clean, dry, & intact 10/13/2017 10:13 PM   Phlebitis Assessment 0 10/13/2017 10:13 PM   Infiltration Assessment 0 10/13/2017 10:13 PM   Dressing Status Clean, dry, & intact 10/13/2017 10:13 PM   Dressing Type Transparent 10/13/2017 10:13 PM   Hub Color/Line Status Pink;Flushed 10/13/2017 10:13 PM   Action Taken Open ports on tubing capped 10/13/2017 10:13 PM   Alcohol Cap Used Yes 10/13/2017 10:13 PM       Peripheral IV 10/12/17 Left Antecubital (Active)   Site Assessment Clean, dry, & intact 10/13/2017 10:13 PM   Phlebitis Assessment 0 10/13/2017 10:13 PM   Infiltration Assessment 0 10/13/2017 10:13 PM   Dressing Status Clean, dry, & intact 10/13/2017 10:13 PM   Dressing Type Transparent 10/13/2017 10:13 PM   Hub Color/Line Status Blue;Flushed 10/13/2017 10:13 PM   Action Taken Open ports on tubing capped 10/13/2017 10:13 PM   Alcohol Cap Used Yes 10/13/2017 10:13 PM        Opportunity for questions and clarification was provided.       Patient transported with: Family via [address] [place] [address] stable at transfer   [employer]

## 2017-10-14 NOTE — DISCHARGE INSTRUCTIONS
Discharge Instructions       PATIENT ID: Toby Walton  MRN: [id number]   YOB: 1924    DATE OF ADMISSION: 10/12/2017  3:35 PM    DATE OF DISCHARGE: 10/14/2017    PRIMARY CARE PROVIDER: Lance Trotter MD     ATTENDING PHYSICIAN: Lisy Reyes MD  DISCHARGING PROVIDER: Lisy Reyes MD    To contact this individual call [phone] and ask the  to page. If unavailable ask to be transferred the Adult Hospitalist Department.     DISCHARGE DIAGNOSES   Severe sepsis due to UTI POA  Hypothermia likely due to sepsis  YONIS dehydration due to poor oral intake  Hypernatremia due to dehydration  Encephalopathy metabolic POA  Chronic A Fib, now bradycardic  Advanced dementia   Hypothyroidism   Left hip eschar     CONSULTATIONS: IP CONSULT TO HOSPITALIST    PROCEDURES/SURGERIES: * No surgery found *    PENDING TEST RESULTS:   At the time of discharge the following test results are still pending: none    FOLLOW UP APPOINTMENTS:   Hospice Care      ADDITIONAL CARE RECOMMENDATIONS:     DIET: As tolerated     ACTIVITY: Bedrest    WOUND CARE: none    EQUIPMENT needed: none      DISCHARGE MEDICATIONS:   See Medication Reconciliation Form      DISPOSITION:    Home With:   OT  PT  Located within [facility]  RN       SNF/Inpatient Rehab/LTAC    Independent/assisted living   x Hospice    Other:     CDMP Checked:   Yes x     PROBLEM LIST Updated:  Yes x       Signed:   Lisy Reyes MD  10/14/2017  1:59 PM

## 2017-10-14 NOTE — HOSPICE
[facility]  [phone]    Inpatient Nursing PRE Admission   Patient Name: Lauro Campos  YOB: 1924  Age: 80 y.o. Date of PLANNED Hospice Admission: 10/14/17  Hospice Attending: Dr. Kunal Curran  Primary Care Physician: Derek Hong MD     Home Hospice Zip Code:  Expected  (if patient transferred to [facility]): TBD    ADVANCE CARE PLANNING    Code Status: DDNR  Durable DNR: [x]  Yes  []  No  Advance Care Planning 10/13/2017   Patient's Healthcare Decision Maker is: -   Primary Decision Maker Name -   Confirm Advance Directive Yes, not on file       Gnosticism: [id number]: [employer]- this may change as family is reviewing cost, plan is for cremation and then burial in [place]. HOSPICE SUMMARY   ER Visits/ Hospitalizations in past year: 2 including this admission  Hospice Diagnosis: Sepsis  Onset Date of Hospice Diagnosis: 10/12/17  Summary of Disease Progression Leading to Hospice Diagnosis: This is a 20-year-old female with   past medical history of hypertension, atrial fibrillation on Xarelto, advanced dementia, hypothyroidism. She was sent from the nursing   facility because of abnormal blood work. Briefly, the patient has advanced dementia and over the last few   months, the patient has deteriorated quite significantly to the point that since last few weeks she has declined eating or drinking anything. About 3 weeks ago, the patient had blood work done, which indicated that she might dehydrated. At that time, she was taken off diuretic. About one week ago, another blood work was done, which showed continued deterioration. Patient is minimally responsive, taking only a few sips of water, has a severe UTI, hypothermic, hypotensive with little chance of recovery. Family has elected hospice care.      Co-Morbidities:   Patient Active Problem List   Diagnosis Code    Essential hypertension I10    Osteoporosis M81.0    Depression with anxiety F41.8    Paroxysmal atrial fibrillation (HCC) I48.0    ACP (advance care planning) Z71.89    Venous insufficiency of both lower extremities I87.2    Mild cognitive impairment with memory loss G31.84    Sepsis (HCC) A41.9     Diagnoses RELATED to the terminal prognosis: Sepsis, dementia, venous insufficiency of both lower extremities, paroxysmal AFIB  Other Diagnoses: essential hypertension (patient currently hypotensive) Osteoporosis, depression with anxiety, cognitive impairment with memory loss    Rationale for a prognosis of life expectancy of 6 months or less if the disease follows its normal course (Disease Specific History):     Michelle Stearns is a 80 y.o. who was admitted to [facility]. The patient's principle diagnosis of sepsis r/t UTI  has resulted in hypotension, hypothermic, minimal responsiveness, lack of appetite, general debility and added confusion. Functionally, the patient's Palliative Performance Scale has declined over a period of one week and is estimated at 20. Objective information that support this patients limited prognosis includes:     Results for Jaya Goodrich (MRN [id number]) as of 10/14/2017 12:41   Ref. Range 10/12/2017 16:04 10/12/2017 16:04   WBC Latest Ref Range: 3.6 - 11.0 K/uL 8.6    RBC Latest Ref Range: 3.80 - 5.20 M/uL 4.62    HGB Latest Ref Range: 11.5 - 16.0 g/dL 12.0    HCT Latest Ref Range: 35.0 - 47.0 % 37.8    MCV Latest Ref Range: 80.0 - 99.0 FL 81.8    MCH Latest Ref Range: 26.0 - 34.0 PG 26.0    MCHC Latest Ref Range: 30.0 - 36.5 g/dL 31.7    RDW Latest Ref Range: 11.5 - 14.5 % 23.5 (H)    PLATELET Latest Ref Range: 150 - 400 K/uL 195    NEUTROPHILS Latest Ref Range: 32 - 75 % 75    LYMPHOCYTES Latest Ref Range: 12 - 49 % 15    MONOCYTES Latest Ref Range: 5 - 13 % 9    EOSINOPHILS Latest Ref Range: 0 - 7 % 1    BASOPHILS Latest Ref Range: 0 - 1 % 0    DF Latest Units:   SMEAR SCANNED    ABS.  NEUTROPHILS Latest Ref Range: 1.8 - 8.0 K/UL 6.4    ABS. LYMPHOCYTES Latest Ref Range: 0.8 - 3.5 K/UL 1.3    ABS. MONOCYTES Latest Ref Range: 0.0 - 1.0 K/UL 0.8    ABS. EOSINOPHILS Latest Ref Range: 0.0 - 0.4 K/UL 0.1    ABS. BASOPHILS Latest Ref Range: 0.0 - 0.1 K/UL 0.0    RBC COMMENTS Latest Units:   DANYEL CELLS. .. ANISOCYTOSIS. .. Color Latest Units:   YELLOW    Appearance Latest Ref Range: CLEAR   TURBID (A)    Specific gravity Latest Ref Range: 1.003 - 1.030   1.016    pH (UA) Latest Ref Range: 5.0 - 8.0   6.0    Protein Latest Ref Range: NEG mg/dL 100 (A)    Glucose Latest Ref Range: NEG mg/dL NEGATIVE    Ketone Latest Ref Range: NEG mg/dL NEGATIVE    Blood Latest Ref Range: NEG   LARGE (A)    Bilirubin Latest Ref Range: NEG   NEGATIVE    Urobilinogen Latest Ref Range: 0.2 - 1.0 EU/dL 0.2    Nitrites Latest Ref Range: NEG   POSITIVE (A)    Leukocyte Esterase Latest Ref Range: NEG   LARGE (A)    Epithelial cells Latest Ref Range: FEW /lpf FEW    WBC Latest Ref Range: 0 - 4 /hpf >100 (H)    RBC Latest Ref Range: 0 - 5 /hpf     Bacteria Latest Ref Range: NEG /hpf 4+ (A)    INR Latest Ref Range: 0.9 - 1.1   1.3 (H)    Prothrombin time Latest Ref Range: 9.0 - 11.1 sec 12.7 (H)    Sodium Latest Ref Range: 136 - 145 mmol/L 150 (H)    Potassium Latest Ref Range: 3.5 - 5.1 mmol/L HEMOLYZED,RECOLLE. ..     Chloride Latest Ref Range: 97 - 108 mmol/L 119 (H)    CO2 Latest Ref Range: 21 - 32 mmol/L 26    Anion gap Latest Ref Range: 5 - 15 mmol/L 5    Glucose Latest Ref Range: 65 - 100 mg/dL 89    BUN Latest Ref Range: 6 - 20 MG/ (H)    Creatinine Latest Ref Range: 0.55 - 1.02 MG/DL 4.11 (H)    BUN/Creatinine ratio Latest Ref Range: 12 - 20   26 (H)    Calcium Latest Ref Range: 8.5 - 10.1 MG/DL 10.1    GFR est non-AA Latest Ref Range: >60 ml/min/1.73m2 10 (L)    GFR est AA Latest Ref Range: >60 ml/min/1.73m2 12 (L)    Bilirubin, total Latest Ref Range: 0.2 - 1.0 MG/DL 0.8    Protein, total Latest Ref Range: 6.4 - 8.2 g/dL 7.6 Albumin Latest Ref Range: 3.5 - 5.0 g/dL 2.4 (L)    Globulin Latest Ref Range: 2.0 - 4.0 g/dL 5.2 (H)    A-G Ratio Latest Ref Range: 1.1 - 2.2   0.5 (L)    ALT (SGPT) Latest Ref Range: 12 - 78 U/L 65    AST Latest Ref Range: 15 - 37 U/L HEMOLYZED,RECOLLE. .. Alk. phosphatase Latest Ref Range: 45 - 117 U/L 158 (H)    Lactic acid Latest Ref Range: 0.4 - 2.0 MMOL/L 1.5    CULTURE, BLOOD, PAIRED Unknown Rpt    CULTURE, URINE     Unknown Rpt (A)        Chest X ray   Status Provider Status        Final result (Exam End: 10/12/2017  5:42 PM) Open        Study Result      INDICATION: Altered mental status. Acute renal failure.     COMPARISON: March 17, 2017     FINDINGS: AP portable imaging of the chest performed at 5:40 PM demonstrates a  stable cardiomediastinal silhouette. The thoracic aorta remains tortuous and  atherosclerotic. The lungs are clear bilaterally. Degenerative changes are again  seen in the thoracic spine.     IMPRESSION  IMPRESSION: No evidence of acute cardiopulmonary process.          10/12/2017  4:59 PM - Matheus, Card Result In       Component Results      Component Value Ref Range & Units Status     Ventricular Rate 45 BPM Final     Atrial Rate 45 BPM Final     P-R Interval 210 ms Final     QRS Duration 112 ms Final     Q-T Interval 532 ms Final     QTC Calculation (Bezet) 460 ms Final     Calculated P Axis 43 degrees Final     Calculated R Axis -39 degrees Final     Calculated T Axis 24 degrees Final     Diagnosis   Final     Sinus bradycardia with 1st degree AV block   Left axis deviation   When compared with ECG of 17-MAR-2017 11:51,   No significant change was found   Confirmed by Rahul Martinez MD, Ashli Godwin ([id number]) on 10/12/2017 4:58:59 PM      Wound Consult:  New Patient Visit. Chart reviewed. Consulted for left hip ulcer and right arm skin tear. Patient's daughter, Gokul Mahmood is at bedside; she is RN Educator here at [employer] [facility] [employer] and provides history/care with me to mom. Mrs. Donavon Yee is alert; she has some residual confusion related to her UTI/Sepsis but is communicating. She had surgery to left hip with well healed incision but has developed what appears to be pressure injury adjacent to site. Spoke with patients nurse,  Monica Mansfield to hand off on visit/treatment. Patient is resting on a Versacare bed with accumax foam mattress. A posey pad is in place. Assessment:  Left hip - 2.2 x 1.6 x 0.1 cm area of injury - loose moist black devitalized tissue removed partially from base revealing a 90% red, base with granulation present and 10% adhered black/brown moist tissue, pink intact blanching skin surrounding; no drainage or odor is noted; without knowledge of any trauma to area would place etiology as pressure injury; Stage 3 POA. The adjacent incision is well healed thin pale white line. Right arm - 4 x 1 cm nearly resurface skin tear - pink, drying. No drainage noted. No surrounding redness. Left heel with blanching pink intact skin, no redness to right. Sacrum / buttocks areas intact, no redness. Had incontinent episode of both urine and hard stool with some loose stool surrounding. Treatment:  Incontinence skin care. Aloe vesta applied. Mepitel One to right arm skin tear. DuoDerm to left hip ulcer. Repositioned and floated heels. Wound Recommendations:  Wound Care: Cleanse left hip pressure injury with saline, pat dry and apply DuoDerm weekly. Mepitel one dressing to right arm skin tear, change as needed. Skin Care Recommendations:  1. Minimize friction/shear: minimize layers of linen/pads under patient. 2. Off load pressure/reposition: continue to turn and reposition approximately every 2 hours; float heels or use Prevalon boots; waffle cushion for sitting. 3. Manage Moisture - keep skin folds dry; incontinence skin care as needed; briefs in use for dual incontinence. 4. Continue to monitor nutrition, pain, and skin risk scale, and skin assessment.     The patient/family chose comfort measures with the support of Hospice. Patient meets for routineLOC as evidenced by minimally responsive, still with small amounts of oral intake, IVF has been stopped, hypotensive, hypothermic. Verbal certification of terminal diagnosis with life expectancy of 6 months or less received from: Dr. Jan Reardon    Prognosis estimated based on 10/14/17 clinical assessment is:   [] Few to Many Hours  [x] Hours to Days   [x] Few to Many Days   [] Days to Weeks   [] Few to Many Weeks   [] Weeks to Months   [] Few to Many Months    CLINICAL INFORMATION   Allergies: Allergies   Allergen Reactions    Codeine Other (comments)     Dizziness    Keflex [Cephalexin] Rash       Wt Readings from Last 3 Encounters:   10/12/17 67.6 kg (149 lb)   06/26/17 67.8 kg (149 lb 8 oz)   04/05/17 70.9 kg (156 lb 6.4 oz)     Ht Readings from Last 3 Encounters:   10/12/17 5' 3\" (1.6 m)   06/26/17 5' 3\" (1.6 m)   04/05/17 5' 3\" (1.6 m)     Body mass index is 26.39 kg/(m^2). Visit Vitals    /54 (BP 1 Location: Left arm, BP Patient Position: At rest)    Pulse (!) 52    Temp (!) 94.1 °F (34.5 °C)    Resp 16    Ht 5' 3\" (1.6 m)    Wt 67.6 kg (149 lb)    SpO2 94%    BMI 26.39 kg/m2       LAB VALUES  No results found for this visit on 10/12/17 (from the past 12 hour(s)). No results found for this visit on 10/12/17 (from the past 6 hour(s)). Lab Results   Component Value Date/Time    Protein, total 5.3 10/13/2017 10:50 AM    Albumin 1.8 10/13/2017 10:50 AM       Currently this patient has:  [] Supplemental O2 [x] Peripheral IV  [] PICC    [] PORT   [] Langston Catheter [] NG Tube   [] PEG Tube [] Ostomy    [] AICD: Has ICD been deactivated?   [] Yes [] No:______    Progression to DEPENDENCE WITH ADLs (include time frame): present  x- Dependent for bathing: personal hygiene and grooming   x- Dependent for dressing: dressing and undressing   x- Dependent for transferring: movement and mobility   x- Dependent for toileting: continence-related tasks including control and hygiene   x- Dependent for eating: preparing food and feeding    ASSESSMENT & PLAN     1. Symptom Issues Identified: minimal responsive, hypotensive, hypothermic, confusion, anxiety    2. Spiritual Issues Identified: patient is [employer]odoxy    3. Psych/ Social/ Emotional Issues Identified: none at this time    4. Care Coordination:   Transfer to: [facility]                     Report given to: Staff RN at [facility]    Transportation by:[facility]   Scheduled for 2:00PM           Medications Needs:   Current Facility-Administered Medications   Medication Dose Route Frequency    LORazepam (ATIVAN) injection 0.5 mg  0.5 mg IntraVENous Q6H PRN    morphine injection 0.5 mg  0.5 mg IntraVENous Q4H PRN    sodium chloride (NS) flush 5-10 mL  5-10 mL IntraVENous Q8H    loperamide (IMODIUM) capsule 2 mg  2 mg Oral Q8H PRN    senna-docusate (PERICOLACE) 8.6-50 mg per tablet 1 Tab  1 Tab Oral DAILY PRN    acetaminophen (TYLENOL) tablet 650 mg  650 mg Oral Q6H PRN    sodium chloride (NS) flush 5-10 mL  5-10 mL IntraVENous PRN    ondansetron (ZOFRAN) injection 4 mg  4 mg IntraVENous Q4H PRN          DME: TBD    Supplies: Briefs, patient does not get up, may want to place a tobin as she also has wounds. See above.

## 2017-10-14 NOTE — HOSPICE
[facility] Liaison note:    Met with patient's daughter Kendra Tse and son Ame Baxter for follow up on transition to hospice care at [facility]. Consents are signed, notified Case Management for transportation arrangements, will fax consents to hospice house. Dr. Tony Mckinnon is aware and is placing his discharge orders. Copy of DDNR is on the chart. Notified Kendra sTe that patient's transport is arranged for 2pm today via [facility]. Patient's IV's to remain in place. Report to be called to RN staff at [facility] at [phone].     Thank you,    Jian Jimenez RN  [facility]

## 2017-10-14 NOTE — PROGRESS NOTES
Bedside shift change report given to Laisha Seth RN (oncoming nurse) by Derk Peabody, RN (offgoing nurse). Report included the following information SBAR, Kardex, Procedure Summary, Intake/Output, MAR and Recent Results.

## 2017-10-14 NOTE — PROGRESS NOTES
Bedside and Verbal shift change report given to Jace (oncoming nurse) by Jasmeet Barragan (offgoing nurse). Report included the following information SBAR, Kardex and Intake/Output.

## 2017-10-14 NOTE — PROGRESS NOTES
Hospitalist Progress Note  Jake Rosales MD  Answering service: [phone] from in house phone  Cell: [phone]      Date of Service:  10/14/2017  NAME:  Ross Horn  :  1924  MRN:  [id number]      Admission Summary: This is a 80-year-old female with past medical history of hypertension, atrial fibrillation on Xarelto, advanced dementia, hypothyroidism. She was sent from the nursing facility because of abnormal blood work. This full history is as per the patient's daughter who is sitting by the patient. I was not able to gather any history from the patient because of altered mental state. Briefly, the patient has advanced dementia and over the last few months, the patient has deteriorated quite significantly to the point that since last few weeks she has declined eating or drinking anything. About 3 weeks ago, the patient had blood work done, which indicated that she might dehydrated. At that time, she was taken off diuretic. About one week ago, another blood work was done, which showed continued deterioration.  Finally today it was decided by the facility that she should come to the ER.        Interval history / Subjective:   No pain, patient looks comfortable, daughter and son at bedside and wants to transfer patient to hospice house,      Assessment & Plan:     Severe sepsis due to UTI POA  -hypotensive, hypothermic, bradycardic, renal failure, poor prognosis, on comfort care  -on zosyn and IVF  -had a long discussion with patient daughter at bedside, requested to discontinue IVF, IV antibiotics and transitioned to only comfort care, she is awaiting her brother who is coming this afternoon.   -lorazepam and morphine iv prn added  -hospice care on board    Hypothermia likely due to sepsis  -still hypothermic, on Braydon Hugger  -supportive care    YONIS dehydration due to poor oral intake  -on IVF D5W, discontinue on 10/13    Hypernatremia due to dehydration  -has received iv fluids, now discontinued per daughter request    Encephalopathy metabolic POA  -improved, patient conscious and alert, answer simple questions    Chronic A Fib, now bradycardic  -diltiazem and xarelto is held    Advanced dementia   -conscious and alert, answer simple questions  -continue supportive care    Hypothyroidism   -synthroid discontinued    Left hip eschar   -wound care nurse on board    DNR, poor prognosis, on comfort care only        Code status: DNR  DVT prophylaxis: SCD    Care Plan discussed with: Patient/Family, Nurse and   Disposition: Possible NH with hospice care vs hospice house     Hospital Problems  Date Reviewed: 6/26/2017          Codes Class Noted POA    * (Principal)Sepsis ([facility] [address] ICD-10-CM: A41.9  ICD-9-CM: 038.9, 995.91  10/12/2017 Unknown                Vital Signs:    Last 24hrs VS reviewed since prior progress note. Most recent are:  Visit Vitals    /54 (BP 1 Location: Left arm, BP Patient Position: At rest)    Pulse (!) 52    Temp (!) 94.1 °F (34.5 °C)    Resp 16    Ht 5' 3\" (1.6 m)    Wt 67.6 kg (149 lb)    SpO2 94%    BMI 26.39 kg/m2         Intake/Output Summary (Last 24 hours) at 10/14/17 0950  Last data filed at 10/13/17 1415   Gross per 24 hour   Intake                0 ml   Output                0 ml   Net                0 ml        Physical Examination:             Constitutional:  No acute distress, cooperative, pleasant    ENT:  Oral mucous moist, oropharynx benign. Neck supple,    Resp:  Decrease bronchial breath sound bilaterally. No wheezing/rhonchi/rales. No accessory muscle use   CV:  Regular rhythm, normal rate, no murmurs, gallops, rubs    GI:  Soft, non distended, non tender.  normoactive bowel sounds, no hepatosplenomegaly     Musculoskeletal:  No edema,    Neurologic:  Conscious and alert, follows simple command, moves all extremities      Skin:  left hip ischar       Data Review: Review and/or order of clinical lab test      Labs:     Recent Labs      10/12/17   1604   WBC  8.6   HGB  12.0   HCT  37.8   PLT  195     Recent Labs      10/13/17   1050  10/12/17   1604   NA  154*  150*   K  3.4*  HEMOLYZED,RECOLLECT REQUESTED   CL  121*  119*   CO2  27  26   BUN  92*  107*   CREA  3.45*  4.11*   GLU  106*  89   CA  8.4*  10.1   MG  2.1   --    PHOS  1.8*   --      Recent Labs      10/13/17   1050  10/12/17   1604   SGOT  37  HEMOLYZED,RECOLLECT REQUESTED   ALT  39  65   AP  118*  158*   TBILI  0.6  0.8   TP  5.3*  7.6   ALB  1.8*  2.4*   GLOB  3.5  5.2*     Recent Labs      10/12/17   1604   INR  1.3*   PTP  12.7*      No results for input(s): FE, TIBC, PSAT, FERR in the last 72 hours. No results found for: FOL, RBCF   No results for input(s): PH, PCO2, PO2 in the last 72 hours. No results for input(s): CPK, CKNDX, TROIQ in the last 72 hours.     No lab exists for component: CPKMB  No results found for: CHOL, CHOLX, CHLST, CHOLV, HDL, LDL, LDLC, DLDLP, TGLX, TRIGL, TRIGP, CHHD, CHHDX  Lab Results   Component Value Date/Time    Glucose (POC) 92 03/17/2017 06:29 PM    Glucose (POC) 144 08/06/2014 05:02 PM    Glucose (POC) 135 08/03/2014 05:10 PM     Lab Results   Component Value Date/Time    Color YELLOW 10/12/2017 04:04 PM    Appearance TURBID 10/12/2017 04:04 PM    Specific gravity 1.016 10/12/2017 04:04 PM    pH (UA) 6.0 10/12/2017 04:04 PM    Protein 100 10/12/2017 04:04 PM    Glucose NEGATIVE  10/12/2017 04:04 PM    Ketone NEGATIVE  10/12/2017 04:04 PM    Bilirubin NEGATIVE  10/12/2017 04:04 PM    Urobilinogen 0.2 10/12/2017 04:04 PM    Nitrites POSITIVE 10/12/2017 04:04 PM    Leukocyte Esterase LARGE 10/12/2017 04:04 PM    Epithelial cells FEW 10/12/2017 04:04 PM    Bacteria 4+ 10/12/2017 04:04 PM    WBC >100 10/12/2017 04:04 PM    RBC  10/12/2017 04:04 PM         Medications Reviewed:     Current Facility-Administered Medications   Medication Dose Route Frequency    LORazepam (ATIVAN) injection 0.5 mg  0.5 mg IntraVENous Q6H PRN    morphine injection 0.5 mg  0.5 mg IntraVENous Q4H PRN    sodium chloride (NS) flush 5-10 mL  5-10 mL IntraVENous Q8H    loperamide (IMODIUM) capsule 2 mg  2 mg Oral Q8H PRN    senna-docusate (PERICOLACE) 8.6-50 mg per tablet 1 Tab  1 Tab Oral DAILY PRN    acetaminophen (TYLENOL) tablet 650 mg  650 mg Oral Q6H PRN    sodium chloride (NS) flush 5-10 mL  5-10 mL IntraVENous PRN    ondansetron (ZOFRAN) injection 4 mg  4 mg IntraVENous Q4H PRN     ______________________________________________________________________  EXPECTED LENGTH OF STAY: 3d 16h  ACTUAL LENGTH OF STAY:          2                 Daiana Mccabe MD

## 2017-10-14 NOTE — DISCHARGE SUMMARY
Discharge Summary       PATIENT ID: Toby Walton  MRN: [id number]   YOB: 1924    DATE OF ADMISSION: 10/12/2017  3:35 PM    DATE OF DISCHARGE: 10/14/2014   PRIMARY CARE PROVIDER: Lance Trotter MD     ATTENDING PHYSICIAN: Jairon Carrillo MD  DISCHARGING PROVIDER: Lisy Reyes MD    To contact this individual call [phone] and ask the  to page. If unavailable ask to be transferred the Adult Hospitalist Department. CONSULTATIONS: IP CONSULT TO HOSPITALIST    PROCEDURES/SURGERIES: * No surgery found *    ADMITTING [address] [place] [address] COURSE:   This is a 80-year-old female with past medical history of hypertension, atrial fibrillation on Xarelto, advanced dementia, hypothyroidism. She was sent from the nursing facility because of abnormal blood work. This full history is as per the patient's daughter who is sitting by the patient. Not able to gather any history from the patient because of altered mental state. Briefly, the patient has advanced dementia and over the last few months, the patient has deteriorated quite significantly to the point that since last few weeks she has declined eating or drinking anything. About 3 weeks ago, the patient had blood work done, which indicated that she might dehydrated. At that time, she was taken off diuretic. About one week ago, another blood work was done, which showed continued deterioration. Finally today it was decided by the facility that she should come to the ER.    Comfort Care per family request  -hospice care involved  -transfer to hospice hous   Severe sepsis due to UTI POA  -hypotensive, hypothermic, bradycardic, renal failure, poor prognosis, on comfort care  -iv zosyn and IVF discontinued on 10/13  -had a long discussion with patient daughter at bedside, requested to discontinue IVF, IV antibiotics and transitioned to only comfort care, she is awaiting her brother who is coming this afternoon.   -lorazepam and morphine iv prn added  -hospice care on board  Hypothermia likely due to sepsis  -still hypothermic, on Braydon Hugger  -supportive care  YONIS dehydration due to poor oral intake  -on IVF D5W, discontinue on 10/13  Hypernatremia due to dehydration  -has received iv fluids, now discontinued per daughter request  Encephalopathy metabolic POA  -improved, patient conscious and alert, answer simple questions  Chronic A Fib, now bradycardic  -diltiazem and xarelto is held   Advanced dementia   -conscious and alert, answer simple questions  -continue supportive care  Hypothyroidism   -synthroid discontinued   Left hip eschar possible pressure injury POA  -wound care nurse on board     DNR, poor prognosis, on comfort care only        Code status: DNR  DVT prophylaxis: SCD      DISCHARGE DIAGNOSES / PLAN:      Comfort care, transfer to hospice house       PENDING TEST RESULTS:   At the time of discharge the following test results are still pending: none    FOLLOW UP APPOINTMENTS:    Hospice Care team    ADDITIONAL CARE RECOMMENDATIONS:     DIET: Comfort Feeding    ACTIVITY: Bedrest    WOUND CARE: left hip eschar dry dressing    EQUIPMENT needed: none      DISCHARGE MEDICATIONS:  Current Discharge Medication List      STOP taking these medications       levothyroxine (SYNTHROID) 25 mcg tablet Comments:   Reason for Stopping:         acetaminophen (TYLENOL) 325 mg tablet Comments:   Reason for Stopping:         OTHER Comments:   Reason for Stopping:         alendronate (FOSAMAX) 70 mg tablet Comments:   Reason for Stopping:         dilTIAZem CD (CARDIZEM CD) 120 mg ER capsule Comments:   Reason for Stopping:         rivaroxaban (XARELTO) 15 mg tab tablet Comments:   Reason for Stopping:         therapeutic multivitamin (THERA) tablet Comments:   Reason for Stopping:         raNITIdine (ZANTAC 75) 75 mg tablet Comments:   Reason for Stopping:         loperamide (IMODIUM) 2 mg capsule Comments:   Reason for Stopping: senna-docusate (PERICOLACE) 8.6-50 mg per tablet Comments:   Reason for Stopping:               DISPOSITION:    Home With:   OT  PT  HH  RN       Long term SNF/Inpatient Rehab    Independent/assisted living   x Hospice    Other:       PATIENT CONDITION AT DISCHARGE:     Functional status   x Poor     Deconditioned     Independent      Cognition     Lucid     Forgetful    x Dementia      Catheters/lines (plus indication)    [place]     PICC     PEG    x None      Code status     Full code    x DNR      PHYSICAL EXAMINATION AT DISCHARGE:   Refer to Progress Note      CHRONIC MEDICAL DIAGNOSES:  Problem List as of 10/14/2017  Date Reviewed: 6/26/2017          Codes Class Noted - Resolved    * (Principal)Sepsis ([facility] [address] ICD-10-CM: A41.9  ICD-9-CM: 038.9, 995.91  10/12/2017 - Present        Mild cognitive impairment with memory loss ICD-10-CM: G31.84  ICD-9-CM: 331.83, 780.93  1/28/2017 - Present        Venous insufficiency of both lower extremities ICD-10-CM: I87.2  ICD-9-CM: 459.81  9/9/2015 - Present        ACP (advance care planning) ICD-10-CM: Z71.89  ICD-9-CM: V65.49  3/26/2015 - Present    Overview Signed 3/26/2015  1:33 PM by Naomie Byrd MD     3/26/2015 signed in office with Dr. Dwayne Boo             Paroxysmal atrial fibrillation [facility]) ICD-10-CM: I48.0  ICD-9-CM: 427.31  10/25/2014 - Present    Overview Signed 3/26/2015 12:36 PM by MD Dr. Trista Szymanski hypertension ICD-10-CM: I10  ICD-9-CM: 401.9  9/8/2011 - Present        Osteoporosis ICD-10-CM: M81.0  ICD-9-CM: 733.00  9/8/2011 - Present    Overview Addendum 7/27/2015 10:11 AM by Naomie Byrd MD     S/p 10 years of Fosamax stopped March 2014.    8/2014 - left proximal femur fracture post fall  Restarted Fosamax March 2015.              Depression with anxiety ICD-10-CM: F41.8  ICD-9-CM: 300.4  9/8/2011 - Present    Overview Signed 1/28/2016 10:23 AM by Naomie Byrd MD     Stopped antidepressant Sept 2014 RESOLVED: UTI (urinary tract infection) ICD-10-CM: N39.0  ICD-9-CM: 599.0  3/20/2017 - 6/26/2017        RESOLVED: Acute delirium ICD-10-CM: R41.0  ICD-9-CM: 780.09  3/17/2017 - 3/20/2017        RESOLVED: Hip fracture, left ([address] ICD-10-CM: S72.002A  ICD-9-CM: 820.8  8/2/2014 - 1/28/2016        RESOLVED: GERD (gastroesophageal reflux disease) ICD-10-CM: K21.9  ICD-9-CM: 530.81  9/8/2011 - 3/27/2014              Greater than 20 minutes were spent with the patient on counseling and coordination of care    Signed:   Anjana Plasencia MD  10/14/2017  2:02 PM

## 2017-10-14 NOTE — PROGRESS NOTES
1453 Pt arrived via EMS to room #11 with DNR and discharge papers. [address] discharge meds with daughter, oriented to Hospice Unit  [address] Darlene Gutiérrez and reviewed discharge meds. Patient is only on comfort kit meds. Will keep medications ordered intravenously due to patient has two working IV sites and discharge meds were ordered intravenously. Pt has had some difficulty with eating and swallowing. Daughter states last time she fed her she held soup in her mouth and it just ran out. Assessment  Neuro - still lethargic from ativan given for transport  Resp - even and unlabored, on room air  Heart - irregular, pulses intact, cap refill less than 3sec  GI - sips only may take ice cream, last BM 10/13   - incontinent wearing adult briefs  Sk- pt has abrasion right great toe, skin tear CAREN, pressure sore left hip, multiple bruising  1600 - IV sites retaped  1700 Rounding pt is resting quietly at this time. Depends changed and alexandra care done. 1900 Report to oncoming shift.      NAME OF PATIENT:  Андрей Kovacs    LEVEL OF CARE:  ROUTINE    REASON FOR GIP:   N/A    *PATIENT REMAINS ELIGIBLE FOR GIP LEVEL OF CARE AS EVIDENCED BY: (MUST BE ADDRESSED OF PATIENT GIP)      REASON FOR RESPITE:  N/A    O2 SAFETY:  N/A    FALL INTERVENTIONS PROVIDED:   Implemented/recommended use of non-skid footwear, Implemented/recommended use of fall risk identification flag to all team members and Implemented/recommended assistive devices and encouraged their use    INTERDISPLINARY COMMUNICATION/COLLABORATION:  Physician, MSW, [place] and RN, CNA    NEW MEDICATION INITIATION DOCUMENTATION:  Consulted AT MD to report change in pt status, Obtained Order from Provider for initiation of symptom relief medication /other medication needed and Documentation completed in Clinical Note in [address] [facility]    Reason medication is being initiated:  NEW ADMIT    MD / Provider name consulted re: change in status / initiation of new medication:  DR EDMOND    New Symptom(s):  NONE    New Order(s):  COMFORT KIT MEDS    Name of the person notified of the changes:  [address]    Name of person being taught:  [address]    Instructions given:  WE WILL CONTINUE SAME MEDS STARTED AT [address], WE WILL USE SAME IV SITES AND POSSIBLE SUBCUTANEOUS AS NEEDED    Side Effects taught:  DROWSINESS    Response to teaching:  VERBALIZED UNDERSTANDING      COMFORTABLE DYING MEASURE:  Is Patient/family satisfied with symptom level?  yes    DISCHARGE PLAN:  TO BE PLANNED

## 2017-10-15 PROBLEM — K11.7 INCREASED OROPHARYNGEAL SECRETIONS: Status: ACTIVE | Noted: 2017-01-01

## 2017-10-15 PROBLEM — E46 MALNUTRITION (HCC): Status: ACTIVE | Noted: 2017-01-01

## 2017-10-15 PROBLEM — F03.C0 ADVANCED DEMENTIA: Status: ACTIVE | Noted: 2017-01-01

## 2017-10-15 NOTE — PROGRESS NOTES
1900 - Bedside and Verbal shift change report given to Federico Gray (oncoming nurse) by Jose Jackson (offgoing nurse). Report included the following information SBAR, Kardex and MAR.     0700 - Bedside and Verbal shift change report given to Jose Jackson (oncoming nurse) by Celeste Muñoz RN (offgoing nurse). Report included the following information SBAR, Kardex and MAR. NAME OF PATIENT:  Jaz Bernard    LEVEL OF CARE:  ROUTINE    REASON FOR GIP:   N/A    *PATIENT REMAINS ELIGIBLE FOR GIP LEVEL OF CARE AS EVIDENCED BY: (MUST BE ADDRESSED OF PATIENT GIP)      REASON FOR RESPITE:  N/A    O2 SAFETY:  N/A    FALL INTERVENTIONS PROVIDED:   Implemented/recommended environmental changes (remove hazards, lower bed, improve lighting, etc.)    INTERDISPLINARY COMMUNICATION/COLLABORATION:  Physician, MSW, [place] and RN, CNA    NEW MEDICATION INITIATION DOCUMENTATION:  N/A    Reason medication is being initiated:  N/A    MD / Provider name consulted re: change in status / initiation of new medication:  N/A    New Symptom(s):  N/A    New Order(s):  N/A    Name of the person notified of the changes:  N/A    Name of person being taught:  N/A    Instructions given:  N/A    Side Effects taught:  N/A    Response to teaching:  N/A      COMFORTABLE DYING MEASURE:  Is Patient/family satisfied with symptom level?  yes    DISCHARGE PLAN:  Will remain at [facility] for end-of-life care.

## 2017-10-15 NOTE — PROGRESS NOTES
0700 Recd report from [facility], was was agitated last pm and was given morphine and ativan x 2  Pt was admitted yesterday from [facility] with diagnosis of Sepsis, pt exhibited hypotension and subnormal  0800 Assessment completed  Neuro - lethargic but opens eyes when turned and repositioned  Resp - even and unlabored, on room air  Heart - irregular, pulses intact, cap refill less than 3sec  GI - sips only may take ice cream, last BM 10/13   - incontinent wearing adult briefs  Sk- pt has abrasion right great toe, skin tear CAREN, pressure sore left hip, multiple bruising, all dressing clean dry intact    1030 Rounding pt is still sleeping, son at bedside, allowed times for questions  1230 Rounding resting quietly turned and repositioned. Son at bedside  1500 Rounding pt is somewhat restless family requesting ativan, PRN 0.5 mg given IV push. 0 Dtr and son updated on status  1700 Pt is resting quietly. 1900 Report to oncoming shift.  Pt has advanced dementia, tries to stay in fetal position, only one dose of ativan all shift only 0.5 mg given.     NAME OF PATIENT:  Victoria Adal     LEVEL OF CARE:  ROUTINE     REASON FOR GIP:   N/A     *PATIENT REMAINS ELIGIBLE FOR GIP LEVEL OF CARE AS EVIDENCED BY: (MUST BE ADDRESSED OF PATIENT GIP)        REASON FOR RESPITE:  N/A     O2 SAFETY:  N/A     FALL INTERVENTIONS PROVIDED:   Implemented/recommended use of non-skid footwear, Implemented/recommended use of fall risk identification flag to all team members and Implemented/recommended assistive devices and encouraged their use     INTERDISPLINARY COMMUNICATION/COLLABORATION:  Physician, MSW, [place] and RN, CNA     NEW MEDICATION INITIATION DOCUMENTATION:  Consulted AT MD to report change in pt status, Obtained Order from Provider for initiation of symptom relief medication /other medication needed and Documentation completed [facility]  Reason medication is being initiated:  NEW ADMIT     MD / Provider name consulted re: change in status / initiation of new medication:  DR EDMOND     New Symptom(s):  NONE     New Order(s):  COMFORT KIT MEDS     Name of the person notified of the changes:  [address]     Name of person being taught:  [address]     Instructions given:  WE WILL CONTINUE SAME MEDS STARTED AT [address], WE WILL USE SAME IV SITES AND POSSIBLE SUBCUTANEOUS AS NEEDED     Side Effects taught:  DROWSINESS     Response to teaching:  VERBALIZED UNDERSTANDING        COMFORTABLE DYING MEASURE:  Is Patient/family satisfied with symptom level?  yes     DISCHARGE PLAN:  TO BE PLANNED

## 2017-10-15 NOTE — PROGRESS NOTES
1900 - Bedside and Verbal shift change report given to Federico Gray (oncoming nurse) by Issa Crowley (offgoing nurse). Report included the following information SBAR, Kardex and MAR.     0700 - Bedside and Verbal shift change report given to RN (oncoming nurse) by Tri Melgar RN (offgoing nurse). Report included the following information SBAR, Kardex and MAR. NAME OF PATIENT:  Thersia Minor    LEVEL OF CARE:  ROUTINE    REASON FOR GIP:   N/A    *PATIENT REMAINS ELIGIBLE FOR GIP LEVEL OF CARE AS EVIDENCED BY: (MUST BE ADDRESSED OF PATIENT GIP)      REASON FOR RESPITE:  N/A    O2 SAFETY:  N/A    FALL INTERVENTIONS PROVIDED:   Implemented/recommended resources for alarm system (personal alarm, bed alarm, call bell, etc.)  and Implemented/recommended environmental changes (remove hazards, lower bed, improve lighting, etc.)    INTERDISPLINARY COMMUNICATION/COLLABORATION:  Physician, MSW, [place] and RN, CNA    NEW MEDICATION INITIATION DOCUMENTATION:  N/A    Reason medication is being initiated:  N/A    MD / Provider name consulted re: change in status / initiation of new medication:  N/A    New Symptom(s):  N/A    New Order(s):  N/A    Name of the person notified of the changes:  N/A    Name of person being taught:  N/A    Instructions given:  N/A    Side Effects taught:  N/A    Response to teaching:  N/A      COMFORTABLE DYING MEASURE:  Is Patient/family satisfied with symptom level?  yes    DISCHARGE PLAN:  Will remain at [facility] for end-of-life care.

## 2017-10-15 NOTE — H&P
[facility]  [phone]    Patient Name: Giselle Garcia  YOB: 1924    Date of Provider Hospice Visit: 10/15/17    Level of Care:   [] General Inpatient (GIP)    [x] Routine   [] Respite    Location of Care:  [] [facility] [] [facility] [] [facility] [] [facility] [x] [facility]    Date of Original Hospice Admission: 10/14/17  Hospice Medical Director for Inpatient Care: Dr. Home Ahmadi Diagnosis: Urosepsis  Diagnoses RELATED to the terminal prognosis: Advanced dementia, poor po intake, dehydration, malnutrition       HOSPICE SUMMARY       Giselle Garcia is a 80y.o. year old who was admitted to [facility]. The patient's principle diagnosis has resulted in accelerated decline in an already poor functioning patient with advanced dementia. Functionally, the patient's Karnofsky and/or Palliative Performance Scale has declined over a period of weeks and is estimated at 20%. The patient is dependent on all ADls. Patient with advanced dementia, non verbal, mostly bed bound had completley stopped eating per mouth about 3 weeks ago. She was admitted to [facility] with hypotension and acute renal failure likely secondary to urosepsis. She was treated promptly with IV fluids and antibiotics while patient remained asymptomatic, patient's daughter decided to transition to comfort care only and to hospice. Objective information that support this patients limited prognosis includes: Crt- 3.45 from a normal baseline, Albumin- 1.8, Na- 154, platelet- 922. Patient remains non verbal without po intake. The patient/family chose comfort measures with the support of Hospice. HOSPICE DIAGNOSES   Active Symptoms:  1. Fatigue  2. No po intake  3. Excessive secretions     PLAN   1. Admit patient to Hospice under routine care. 2. Very elderly, frail woman without po intake for more than 2 weeks. 3. Prn ativan for comfort  4. On Scopolamine patch  5.  Reposition patient for comfort as needed. 6.  and SW to support family needs  7. Disposition: routine care for now. Patient appears comfortable and is end of life. Prognosis estimated based on 10/15/17 clinical assessment is:   [x] Hours to Days    [] Days to Weeks    [] Other:    Communicated plan of care with: Hospice Case Manager; Hospice IDT; Care Team     GOALS OF CARE     Resuscitation Status: DNR  Durable DNR: [x] Yes [] No    Advance Care Planning 10/15/2017   Patient's Healthcare Decision Maker is: -   Primary Decision Maker Name [phone]   Primary Decision Maker Phone Number -   Primary Decision Maker Relationship to Patient Adult child   Confirm Advance Directive -        HISTORY     History obtained from: chart    CHIEF COMPLAINT:   The patient is:   [] Verbal  [x] Nonverbal  [x] Unresponsive    HPI/SUBJECTIVE:    Patient appears comfortable. Family at bedside.         REVIEW OF SYSTEMS     The following systems were: [] reviewed  [] unable to be reviewed    Positive ROS include:  Constitutional: fatigue, weakness, in pain, short of breath  Ears/nose/mouth/throat: increased airway secretions  Respiratory:shortness of breath, wheezing  Gastrointestinal:poor appetite, nausea, vomiting, abdominal pain, constipation, diarrhea  Musculoskeletal:pain, deformities, swelling legs  Neurologic:confusion, hallucinations, weakness  Psychiatric:anxiety, feeling depressed, poor sleep  Endocrine:     Adult Non-Verbal Pain Assessment Score: 0    Face  [x] 0   No particular expression or smile  [] 1   Occasional grimace, tearing, frowning, wrinkled forehead  [] 2   Frequent grimace, tearing, frowning, wrinkled forehead    Activity (movement)  [x] 0   Lying quietly, normal position  [] 1   Seeking attention through movement or slow, cautious movement  [] 2   Restless, excessive activity and/or withdrawal reflexes    Guarding  [x] 0   Lying quietly, no positioning of hands over areas of body  [] 1   Splinting areas of the body, tense  [] 2   Rigid, stiff    Physiology (vital signs)  [x] 0   Stable vital signs  [] 1   Change in any of the following: SBP > 20mm Hg; HR > 20/minute  [] 2   Change in any of the following: SBP > 30mm Hg; HR > 25/minute    Respiratory  [x] 0   Baseline RR/SpO2, compliant with ventilator  [] 1   RR > 10 above baseline, or 5% drop SpO2, mild asynchrony with ventilator  [] 2   RR > 20 above baseline, or 10% drop SpO2, asynchrony with ventilator     FUNCTIONAL ASSESSMENT     Palliative Performance Scale (PPS):     PSYCHOSOCIAL/SPIRITUAL ASSESSMENT     Active Problems:    * No active hospital problems.  *    Past Medical History:   Diagnosis Date    A-fib ([facility] [address]     Dementia     GERD (gastroesophageal reflux disease)     Hypertension     Osteoporosis       Past Surgical History:   Procedure Laterality Date    HX HEENT      Tonsillectomy    HX MALIGNANT SKIN LESION EXCISION  2015    basal cell left forehead - [facility] ORTHOPAEDIC  2007    R TKR - Dr. Paul Chin HX ORTHOPAEDIC  2004    L TKR    HX ORTHOPAEDIC  2014    partial left hip replacement      Social History   Substance Use Topics    Smoking status: Former Smoker     Quit date: 1/15/1964    Smokeless tobacco: Never Used    Alcohol use No     Family History   Problem Relation Age of Onset    Dementia Mother     Heart Disease Father     Lung Disease Brother     No Known Problems Daughter     No Known Problems Son     No Known Problems Son       Allergies   Allergen Reactions    Codeine Other (comments)     Dizziness    Keflex [Cephalexin] Rash      Current Facility-Administered Medications   Medication Dose Route Frequency    sennosides (SENOKOT) 8.8 mg/5 mL syrup 8.8 mg  5 mL Oral BID PRN    bisacodyl (DULCOLAX) suppository 10 mg  10 mg Rectal DAILY PRN    ondansetron (ZOFRAN ODT) tablet 4 mg  4 mg Oral Q6H PRN    LORazepam (ATIVAN) injection 0.5 mg  0.5 mg IntraVENous Q6H PRN    acetaminophen (TYLENOL) tablet 650 mg 650 mg Oral Q4H PRN    Or    acetaminophen (TYLENOL) solution 650 mg  650 mg Oral Q4H PRN    Or    acetaminophen (TYLENOL) suppository 650 mg  650 mg Rectal Q4H PRN    morphine injection 0.5 mg  0.5 mg IntraVENous Q4H PRN        PHYSICAL EXAM     Wt Readings from Last 3 Encounters:   10/12/17 67.6 kg (149 lb)   06/26/17 67.8 kg (149 lb 8 oz)   04/05/17 70.9 kg (156 lb 6.4 oz)       Visit Vitals    BP 94/55 (BP 1 Location: Right arm, BP Patient Position: At rest;Head of bed elevated (Comment degrees))    Pulse (!) 53    Temp 97.1 °F (36.2 °C)    Resp 12    SpO2 97%       Supplemental O2  [] Yes  [x] NO  Last bowel movement:     Currently this patient has:  [] Peripheral IV [] PICC  [] PORT [] ICD    [] [place] Catheter [] NG Tube   [] PEG Tube    [] Rectal Tube [] Drain  [] Other:     Constitutional: appears frail  Eyes: closed  ENMT: moist and clean  Cardiovascular: regular heart sounds  Respiratory: soft, shallow breaths  Gastrointestinal: soft, bs present  Musculoskeletal:cachectic, atrophied muscles  Skin:dry  Neurologic:na  Psychiatric:   Other:       Pertinent Lab and or Imaging Tests:  Lab Results   Component Value Date/Time    Sodium 154 10/13/2017 10:50 AM    Potassium 3.4 10/13/2017 10:50 AM    Chloride 121 10/13/2017 10:50 AM    CO2 27 10/13/2017 10:50 AM    Anion gap 6 10/13/2017 10:50 AM    Glucose 106 10/13/2017 10:50 AM    BUN 92 10/13/2017 10:50 AM    Creatinine 3.45 10/13/2017 10:50 AM    BUN/Creatinine ratio 27 10/13/2017 10:50 AM    GFR est AA 15 10/13/2017 10:50 AM    GFR est non-AA 12 10/13/2017 10:50 AM    Calcium 8.4 10/13/2017 10:50 AM     Lab Results   Component Value Date/Time    Protein, total 5.3 10/13/2017 10:50 AM    Albumin 1.8 10/13/2017 10:50 AM           Total time: 70m  Counseling / coordination time: 60m  > 50% counseling / coordination?: y

## 2017-10-16 NOTE — HSP[facility] IDG NURSE NOTES
Patient: Giselle Garcia    Date: 10/15/17  Time: 8:08 PM    [address] [place] [address] Nurse Notes  [facility] COM [facility]  Patient Name  [facility]  Episode -   Date of IDT Meeting - 2017    [facility] Physician   - Dr. Raiza Hernandez for SEPSIS     SYMPTOMS Intermittent episodes of pain and agitation    SIGNS NEURO:  Pt. is lethargic. On complete bedrest. RESP:  Room air. Lung sounds diminished. CARDIO:  HR irregular. No edema noted. GI:  Comfort food as tolerated. : Incontinent of bowel and bladder. MUSC:  No numbness. SKIN:  Stage I to left hip, duoderm dressing intact; skin tear to right upper arm with a mepilex intact; abrasion to right toe and left open to air. LAB VALUES (when available)  N/A     KARNOFSKY  20%     FAST for all dementia  7     Progression to DEPENDENCE WITH ADLs (include time frame)  Pt is on complete bedrest and requires total care for ADLs. PRESSURE ULCERS  Stage I to left hip, duoderm dressing intact; skin tear to right upper arm with a mepilex intact; abrasion to right toe and left open to air.      DISEASE SPECIFIC  Sepsis     FALL RISK:  Low  PROBLEM:   Risk for Falls  GOAL:  Free of fall during episode of care  INTERVENTIONS (INDIVIDUALIZED)   Assess fall risk; monitor medication for change in mental status      Nursing Visit Frequency  2x weekly with 5 visits PRN  Hospice Aide Visit Frequency  Daily x5 days       COPING  GRIEF  ADVANCED DIRECTIVES      BEREAVEMENT  RESOURCES NEEDED   Visit Frequency - 1x month    RESOURCES NEEDED   Visit Frequency  1x month     Bereavement   NO CHANGE     Volunteer  NO VOLUNTEER       SPIRITUAL ISSUES  GRIEF  COPING  [address] RESOURCES   Visit Frequency  1x month     Clinician Signatures  Nurse___Shantel Alba, RN_______  SW________________________________  Chaplain__________________________  Volunteer Coordinator__________________  Bereavement____________________________        Signed by: Michial Sherie

## 2017-10-16 NOTE — HSPC IDG NURSE NOTES
Patient: Eduardo Hedrick    Date: 10/16/17  Time: 4:46 AM    [facility] Nurse Notes  [facility] COM [facility]  Patient Name  Johnie Matias.  Episode -   Date of IDT Meeting - 2017    [facility]         ATTENDING Physician   - Dr. Grove  for SEPSIS     SYMPTOMS Intermittent episodes of pain and agitation    SIGNS NEURO:  Pt. is lethargic. On complete bedrest. RESP:  Room air. Lung sounds diminished. CARDIO:  HR irregular. No edema noted. GI:  Comfort food as tolerated. : Incontinent of bowel and bladder. MUSC:  No numbness. SKIN:  Stage I to left hip, duoderm dressing intact; skin tear to right upper arm with a mepilex intact; abrasion to right toe and left open to air. LAB VALUES (when available)  N/A     KARNOFSKY  20%     FAST for all dementia  7     Progression to DEPENDENCE WITH ADLs (include time frame)  Pt is on complete bedrest and requires total care for ADLs. PRESSURE ULCERS  Stage I to left hip, duoderm dressing intact; skin tear to right upper arm with a mepilex intact; abrasion to right toe and left open to air.      DISEASE SPECIFIC  Sepsis     FALL RISK:  Low  PROBLEM:   Risk for Falls  GOAL:  Free of fall during episode of care  INTERVENTIONS (INDIVIDUALIZED)   Assess fall risk; monitor medication for change in mental status      Nursing Visit Frequency  2x weekly with 5 visits PRN  Hospice Aide Visit Frequency  Daily x5 days       COPING  GRIEF  ADVANCED DIRECTIVES      BEREAVEMENT  RESOURCES NEEDED   Visit Frequency - 1x month    RESOURCES NEEDED   Visit Frequency  1x month     Bereavement   NO CHANGE     Volunteer  NO VOLUNTEER       SPIRITUAL ISSUES  GRIEF  COPING  [address] RESOURCES   Visit Frequency  1x month     Clinician Signatures  Nurse___Shantel Alba, RN_______  SW________________________________  Chaplain__________________________  Volunteer Coordinator__________________  Bereavement____________________________        Signed by: Alejandra [place]

## 2017-10-16 NOTE — PROGRESS NOTES
0700  Report received. 0730  Pt lying in bed, asleep. No facial grimacing or moaning at this time. Lungs diminished. No cough noted.  + bowel sounds. Last reported BM is unknown. Pt is incontinent of urine. No edema noted. Pt has a dressing on her left hip. Dressing is dry and intact. Pt also has a dressing just above her right elbow. Dressing is clear and intact. Pt has IV in R AC and L AC. Dressings are clear and intact. Pt got agitated with assessment. Moving her arms about in bed. Pt hit her right arm on the side rail and grimaced. 0739  Pt medicated with Lorazepam 0.5mg iv.    0800  Pt resting. 1030  Pt's Daughter at the bedside. Rn educated on the signs and symptoms of the dying process. Daughter very appreciative of the information. 1113  Pt turned and repositioned. Depend wet with a very small amt of urine. Pt grimaced with movement. Pt medicated with Morphine IV.  1145  Pt resting comfortably. No signs of distress. 1430  Pt asleep. No signs of distress. Respiration shallow and not labored. Skin is cool to touch. Dtr left the bedside for alittle while. 1640  Rn and CNA in to turn and reposition pt. Pt grimaced with touch. Pt medicated with Morphine and Lorazepam.  Daughter at the bedside. 1830  Pt resting. No complaints at this time. 1900  Report given to NT RN.          NAME OF PATIENT:  Ami Mauricio    LEVEL OF CARE:  Routine    O2 SAFETY:  RA    FALL INTERVENTIONS PROVIDED:   Implemented/recommended resources for alarm system (personal alarm, bed alarm, call bell, etc.)     INTERDISPLINARY COMMUNICATION/COLLABORATION:  Physician, MSW, Valerie and RN, CNA    NEW MEDICATION INITIATION DOCUMENTATION:  No new medications initiated. COMFORTABLE DYING MEASURE:  Is Patient/family satisfied with symptom level?  yes    DISCHARGE PLAN:  Pt will remain at the [facility] until she passes away or until her family makes alternative living arrangements.

## 2017-10-17 NOTE — PROGRESS NOTES
[facility]  [phone]    Patient Name: Ami Mauricio  YOB: 1924    Date of Provider Hospice Visit: 10/17/17    Level of Care:   [] General Inpatient (GIP)    [x] Routine   [] Respite    Location of Care:  [] [address] [place] [address] [] [facility] [] [facility] [] [facility] [x] Beni Paige [address] [place] [address]    Date of Original Hospice Admission: 10/14/17  Hospice Medical Director for Inpatient Care: Dr. Farrah Saucedo Diagnosis: Urosepsis  Diagnoses RELATED to the terminal prognosis: Advanced dementia, poor po intake, dehydration, malnutrition       HOSPICE DIAGNOSES   Active Symptoms:  1. Pain; generalised; non verbal  2. Fatigue/weakness/unresponsiveness  3. Shortness of breath  4. Airway secretions     PLAN   1. Continue routine level care  2. Morphine Iv 0.5-1mg every hour as needed for pain, premedicate prior to turning or ADL care  3. Ativan 0,5mg Iv every hour as needed for anxiety  4. Scopolamine patch to continue  5. Robinul 0.2mg IV every 4 hours as needed for secretions    6.  and SW to support family needs  7. Disposition: home with hospice if survives; unlikely    Prognosis estimated based on 10/17/17 clinical assessment is:   [x] Hours to Days    [] Days to Weeks    [] Other:    Communicated plan of care with: Hospice Case Manager; Hospice IDT; Care Team     GOALS OF CARE     Resuscitation Status: DNR  Durable DNR: [x] Yes [] No    Advance Care Planning 10/15/2017   Patient's Healthcare Decision Maker is: -   Primary Decision Maker Name [phone]   Primary Decision Maker Phone Number -   Primary Decision Maker Relationship to Patient Adult child   Confirm Advance Directive -        HISTORY     History obtained from: chart, family, staff    CHIEF COMPLAINT: N/A  The patient is:   [] Verbal  [x] Nonverbal  [x] Unresponsive    HPI/SUBJECTIVE:  Pt is lethargic and unresponsive, comfortable at rest but moans and grimaces when she is moved during position change or care. Got 2 prn doses of morphine and ativan today       REVIEW OF SYSTEMS     The following systems were: [] reviewed  [x] unable to be reviewed      Adult Non-Verbal Pain Assessment Score: 5    Face  [] 0   No particular expression or smile  [x] 1   Occasional grimace, tearing, frowning, wrinkled forehead  [] 2   Frequent grimace, tearing, frowning, wrinkled forehead    Activity (movement)  [x] 0   Lying quietly, normal position  [] 1   Seeking attention through movement or slow, cautious movement  [] 2   Restless, excessive activity and/or withdrawal reflexes    Guarding  [x] 0   Lying quietly, no positioning of hands over areas of body  [] 1   Splinting areas of the body, tense  [] 2   Rigid, stiff    Physiology (vital signs)  [] 0   Stable vital signs  [] 1   Change in any of the following: SBP > 20mm Hg; HR > 20/minute  [x] 2   Change in any of the following: SBP > 30mm Hg; HR > 25/minute    Respiratory  [] 0   Baseline RR/SpO2, compliant with ventilator  [] 1   RR > 10 above baseline, or 5% drop SpO2, mild asynchrony with ventilator  [x] 2   RR > 20 above baseline, or 10% drop SpO2, asynchrony with ventilator     FUNCTIONAL ASSESSMENT     Palliative Performance Scale (PPS):10-20%     PSYCHOSOCIAL/SPIRITUAL ASSESSMENT     Active Problems:    Malnutrition ([facility] (10/15/2017)      Advanced dementia (10/15/2017)      Increased oropharyngeal secretions (10/15/2017)      Past Medical History:   Diagnosis Date    A-fib ([facility]     Dementia     GERD (gastroesophageal reflux disease)     Hypertension     Osteoporosis       Past Surgical History:   Procedure Laterality Date    HX HEENT      Tonsillectomy    HX MALIGNANT SKIN LESION EXCISION  2015    basal cell left forehead - [place]  2007    R TKR - Dr. Tameka Carballo    HX ORTHOPAEDIC  2004    L TKR    HX ORTHOPAEDIC  2014    partial left hip replacement      Social History   Substance Use Topics    Smoking status: Former Smoker     Quit date: 1/15/1964    Smokeless tobacco: Never Used    Alcohol use No     Family History   Problem Relation Age of Onset    Dementia Mother     Heart Disease Father     Lung Disease Brother     No Known Problems Daughter     No Known Problems Son     No Known Problems Son       Allergies   Allergen Reactions    Codeine Other (comments)     Dizziness    Keflex [Cephalexin] Rash      Current Facility-Administered Medications   Medication Dose Route Frequency    morphine injection 0.5 mg  0.5 mg IntraVENous Q1H PRN    morphine injection 1 mg  1 mg IntraVENous Q1H PRN    glycopyrrolate (ROBINUL) injection 0.2 mg  0.2 mg IntraVENous Q4H PRN    LORazepam (ATIVAN) injection 0.5 mg  0.5 mg IntraVENous Q1H PRN    scopolamine (TRANSDERM-SCOP) 1.5 mg  1.5 mg TransDERmal Q72H    sennosides (SENOKOT) 8.8 mg/5 mL syrup 8.8 mg  5 mL Oral BID PRN    bisacodyl (DULCOLAX) suppository 10 mg  10 mg Rectal DAILY PRN    ondansetron (ZOFRAN ODT) tablet 4 mg  4 mg Oral Q6H PRN    acetaminophen (TYLENOL) tablet 650 mg  650 mg Oral Q4H PRN    Or    acetaminophen (TYLENOL) solution 650 mg  650 mg Oral Q4H PRN    Or    acetaminophen (TYLENOL) suppository 650 mg  650 mg Rectal Q4H PRN        PHYSICAL EXAM     Wt Readings from Last 3 Encounters:   10/12/17 67.6 kg (149 lb)   06/26/17 67.8 kg (149 lb 8 oz)   04/05/17 70.9 kg (156 lb 6.4 oz)       Visit Vitals    BP 90/54 (BP 1 Location: Right arm)    Pulse (!) 48    Temp 97.1 °F (36.2 °C)    Resp 16    SpO2 (!) 88%       Supplemental O2  [] Yes  [x] NO  Last bowel movement:     Currently this patient has:  [x] Peripheral IV [] PICC  [] PORT [] ICD    [] Langston Catheter [] NG Tube   [] PEG Tube    [] Rectal Tube [] Drain  [] Other:     Constitutional: lethargic, pale, thin, frail, unresponsive,moans and grimaces with movements Eyes: closed  ENMT: dry oral mucosa  Cardiovascular: distant heart sounds  Respiratory: Slightly labored breathing, diminished air entry  Gastrointestinal: sunken belly, soft, BS present, non tender  Musculoskeletal:thin.   Skin: warm, dry, no rash, stasis circulation changes  Neurologic:  lethargic, unresponsive  Psychiatric:N/A  Other:       Pertinent Lab and or Imaging Tests:  Lab Results   Component Value Date/Time    Sodium 154 10/13/2017 10:50 AM    Potassium 3.4 10/13/2017 10:50 AM    Chloride 121 10/13/2017 10:50 AM    CO2 27 10/13/2017 10:50 AM    Anion gap 6 10/13/2017 10:50 AM    Glucose 106 10/13/2017 10:50 AM    BUN 92 10/13/2017 10:50 AM    Creatinine 3.45 10/13/2017 10:50 AM    BUN/Creatinine ratio 27 10/13/2017 10:50 AM    GFR est AA 15 10/13/2017 10:50 AM    GFR est non-AA 12 10/13/2017 10:50 AM    Calcium 8.4 10/13/2017 10:50 AM     Lab Results   Component Value Date/Time    Protein, total 5.3 10/13/2017 10:50 AM    Albumin 1.8 10/13/2017 10:50 AM           Total time: 35mts  Counseling / coordination time: 15mts discussing with son, daughter and staff  > 50% counseling / coordination?:

## 2017-10-17 NOTE — PROGRESS NOTES
Problem: Falls - Risk of  Goal: *Absence of Falls  Document Carlie Fall Risk and appropriate interventions in the flowsheet. Outcome: Progressing Towards Goal  Fall Risk Interventions:        Mentation Interventions: Bed/chair exit alarm, Door open when patient unattended     Medication Interventions: Bed/chair exit alarm     Elimination Interventions:  Toileting schedule/hourly rounds     History of Falls Interventions: Door open when patient unattended

## 2017-10-17 NOTE — PROGRESS NOTES
1900 - Bedside and Verbal shift change report given to Federico [address] (oncoming nurse) by Enrique Freeman (offgoing nurse). Report included the following information SBAR, Kardex and MAR.     0700 - Bedside and Verbal shift change report given to Enrique Freeman (oncoming nurse) by Kira Stauffer RN (offgoing nurse). Report included the following information SBAR, Kardex and MAR. NAME OF PATIENT:  Tyrel Matthews    LEVEL OF CARE:  ROUTINE    REASON FOR GIP:   N/A    *PATIENT REMAINS ELIGIBLE FOR GIP LEVEL OF CARE AS EVIDENCED BY: (MUST BE ADDRESSED OF PATIENT GIP)      REASON FOR RESPITE:  N/A    O2 SAFETY:  N/A    FALL INTERVENTIONS PROVIDED:   Implemented/recommended resources for alarm system (personal alarm, bed alarm, call bell, etc.)  and Implemented/recommended environmental changes (remove hazards, lower bed, improve lighting, etc.)    INTERDISPLINARY COMMUNICATION/COLLABORATION:  Physician, MSW, Valerie and RN, CNA    NEW MEDICATION INITIATION DOCUMENTATION:  N/A    Reason medication is being initiated:  N/A    MD / Provider name consulted re: change in status / initiation of new medication:  N/A    New Symptom(s):  N/A    New Order(s):  N/A    Name of the person notified of the changes:  N/A    Name of person being taught:  N/A    Instructions given:  N/A    Side Effects taught:  N/A    Response to teaching:  N/A      COMFORTABLE DYING MEASURE:  Is Patient/family satisfied with symptom level?  yes    DISCHARGE PLAN:  Will remain at [facility] for end-of-life.

## 2017-10-17 NOTE — PROGRESS NOTES
0700  Report received. from NT RN.    6730  Pt lying in bed, unresponsive. Pt grimaced with touch. Brows are furrowed. Lungs diminished with faint rhonchi. Respirations very shallow. Pt is on RA. No cough noted.  + bowel sounds. Last reported Bm was 10-14. Pt is incontinent of  Urine. (Pt did not void over night). No edema noted. Pt is very pale. Fingers are cyanotic. Skin is cool to touch. Pt continues to have a dressing on her L hip that is dry and intact. She also has a clear dressing just above her right elbow. Skin is very fragile. CNA unable to obtain a BP, HR 48, resp 16.    0748 Pt medicated with Morphine and Lorazepam.    0810  Pt resting, comfortably. 200   Pt turned and repositioned. Pt continues to grimace with touch, movement and repositioning. Pt medicated with Morphine and Lorazepam.  New orders received for medicating prior to turning and repositioning. 1150  Pt resting. No signs of distress. 1430  Pt continues to rest comfortably. Hands very cold to touch. Fingers with slight cyanosis. Respirations very shallow and unlabored. No signs of distress. [id number]  Pt's Daughter left for the day. Pt resting comfortably. No distress noted. 1823  Pt resting comfortably. Respirations very shallow and unlabored. 1900  Report given. NAME OF PATIENT:  Jaz Bernard    LEVEL OF CARE:  Routine    FALL INTERVENTIONS PROVIDED:   Implemented/recommended use of fall risk identification flag to all team members and Implemented/recommended resources for alarm system (personal alarm, bed alarm, call bell, etc.)     INTERDISPLINARY COMMUNICATION/COLLABORATION:  Physician, MSW, Valerie and RN, CNA    NEW MEDICATION INITIATION DOCUMENTATION:  No new medications initiated. COMFORTABLE DYING MEASURE:  Is Patient/family satisfied with symptom level?  yes    DISCHARGE PLAN:  Pt will remain at the [facility] until her family makes alternative living arrangements.

## 2017-10-17 NOTE — HOSPICE
NAME OF PATIENT:  Jennifer Rose    LEVEL OF CARE:  routine    REASON FOR GIP:       *PATIENT REMAINS ELIGIBLE FOR GIP LEVEL OF CARE AS EVIDENCED BY: (MUST BE ADDRESSED OF PATIENT GIP)      REASON FOR RESPITE:      O2 SAFETY:      FALL INTERVENTIONS PROVIDED:   Implemented/recommended use of non-skid footwear, Implemented/recommended use of fall risk identification flag to all team members, Implemented/recommended assistive devices and encouraged their use, Implemented/recommended resources for alarm system (personal alarm, bed alarm, call bell, etc.)  and Implemented/recommended environmental changes (remove hazards, lower bed, improve lighting, etc.)    INTERDISPLINARY COMMUNICATION/COLLABORATION:  Physician, MSW, Valerie and RN, CNA    NEW MEDICATION INITIATION DOCUMENTATION:      Reason medication is being initiated:      MD / Provider name consulted re: change in status / initiation of new medication:    New Symptom(s):      New Order(s):      Name of the person notified of the changes:      Name of person being taught:    Instructions given:      Side Effects taught:    Response to teachin [address] free  Is Patient/family satisfied with symptom level?  yes    DISCHARGE PLAN: pt will likely pass away here @ [facility]    20:00,report received,assumed care of pt who is Routine care,hopsice dx is dementia and sepsis. Pt remains unresponsive with brief on. She has 2 peripheral IV sites if needed for meds. Pt appears comfortable. When turned ,no facial grimace noted. 20:30,family present @ bedside. 21:00,pt bathed by CNA,no changes noted. 01:00,no changes,resp. Unlabored. 04:00,appears comfortable,no changes noted. 06:00,turned and repositioned,pt opened her eyes. She remains nonverbal,but still appears comfortable.

## 2017-10-17 NOTE — PROGRESS NOTES
BS Hospice Chaplain Initial Visit for Routine Patient @ [facility]    I visited the room of this pt who is @ [facility] on routine status. She was in bed, unresponsive, and not responsive to soft vocal stimulation. She was not agitated, not restless, and appeared well medicated and comfortable. Her son, Aaliyah Lenz was present. He arrived yesterday from [place]. I introduced myself to him, discussed my role as  within the Care team, and offered on-going support to his mother, his sister (who I have not yet met) and him. He thanked me for my visit to his mother, confirmed that the patient was Anabaptist, but differed to his sister regarding whether she had support from a [place]. I mentioned that I would visit with our patients and read Psalms and Scriptures. He said he thought she would enjoy hearing Psalms and Scriptures read to her. He asked me to visit his mother whenever I could. I also left my contact information with him and indicated he could reach me if he so needed or desired. GOALS:  Continue to visit this pt and her family, to provide pastoral care and spiritual support to assist both the pt and them with coping with this medical decline. Coordinate w/her local [address] if her family requests. Build trust and familiarity with the family to encourage a discussion of their spiritual thoughts and concerns at a deeper and more personal level if they so choose. Validate the emotions and normalize the anticipatory grief of the family regarding this declining situation. Offer written spiritual material to the family as needed or requested and provide a listening presence when needed. PLAN:   Continue to visit this pt and her family, while she is @ [facility] and I am in this facility, or PRN as requested. Coordinate with Care Team on POC.  VISIT FREQUENCY:   1 wk. 1,2,3 starting 10/17/17  plus 4 prn 21 days.      Parisa Hadley, 0920 ERCOM    [phone]

## 2017-10-18 NOTE — PROGRESS NOTES
0700  Report received. 0715  Pt found without respirations or pulse. . Rn notified Daughter Millicent Cook. Juni Gallardo and Iván Mcdermott NP notified. Ashley Po [phone]  Daughter arrived at the [facility]. Daughter grieving appropriately. Family is using [employer] and [phone] [facility] arrived. Pt left the [facility].

## 2017-10-18 NOTE — HOSPICE
[facility]  [phone]    Social Work Admission Note  Patient Name: Sam Nesbitt  YOB: 1924  Age: 80 y.o. Date of Visit: 10-16-17  Facility of Care: [facility]  Patient Room:      Hospice Attending: No att. providers found  Hospice Diagnosis: SEPSIS    Level of Care:    []  GIP    []  Respite   [x]  Routine    NARRATIVE   Patient is a 80year old  female who was admitted to hospice services on 10-14-17 with dx of Sepsis. Patient was transferred to the [facility] from [address] [place] [address] for Routine Level of Care. Msw met with daughter Etienne Bowen who relayed her mother had not been eating or drinking for a number of days. She is minimally responsive. Prior to hospitalization patient was living at [facility] when she began to decline - became withdrawn, eating less, stopped walking and withdrew from family and activities. Patient was a  who obtained her Master's degree from [employer] [address].  Daughter stated her mother was very proud of her accomplishments. Patient has 2 daughters and 2 sons. The sons live in [place] and [facility] - both visiting. There are also 2 grandsons for support.  Home will be "[employer]. Patient is Rastafarian.       ADVANCE CARE PLANNING    Code Status: DNR  Durable DNR: x_ Yes  _ No  Advance Care Planning 10/15/2017   Patient's Healthcare Decision Maker is: -   Primary Decision Maker Name [phone]   Primary Decision Maker Phone Number -   Primary Decision Maker Relationship to Patient Adult child   Confirm Advance Directive -       Relationship Status:  []  Single     []        []      []  Domestic Partner     [x]  /  []  Common Law  []    []  Unknown    If in a relationship, name of partner/spouse:  Duration of relationship:    Taoism: [employer]     Home: [employer] Provided: none identified at this time    Social Work Initial Assessment Gender:  female    Race/Ethnicity: (idalmis all that apply)  []  [place] [facility] or [place] Native  []    []  Black or [place] American  []   or   []   or [place]  [x]  Blinda Talia  []  Unknown      Service:    []  Yes   [x]  No       []  Unknown  Appropriate for Pinning Ceremony:   []  Yes      []  No  Is patient using VA benefits?    []  Yes      []  No     Primary Language: English  []   Needed  []   utilized during visit    Ability to express thoughts/needs/feelings  []  Expressed thoughts/feelings/needs without difficulty  []  Requires extra time and cuing  []  Speech limited single words  []  Uses only gestures (eye, blinking eye or head movement/pointing)  []  Unable to express thoughts/feelings/needs (speech unintelligible or inappropriate)  [x]  Unresponsive  Notes:      Mental Status:  []  Alert-oriented to:     []  Person     []  Place     []  Time  []  Comatose-responds to:    []   Verbal stimuli    []  Tactile stimuli    []  Painful stimuli  []  Forgetful  []  Disoriented/Confused  []  Lethargic  []  Agitated  []  Other (specify):    Notes:      Patients description of Illness/Current Health Status:    [x]  Patient unable to discuss  []  Patient unwilling to discuss  []  (Specify)        Knowledge/Understanding of Disease Process  Patient:    []  Demonstrates knowledge/understanding of disease process   []  Demonstrates knowledge/understanding of treatment plan   []  Demonstrates knowledge/understanding of prognosis   []  Demonstrates acceptance of prognosis   []  Demonstrates knowledge/understanding of resuscitation status   [x]  Other (specify)   Unable to discuss    Caregiver:   [x]  Demonstrates knowledge/understanding of disease process   [x]  Demonstrates knowledge/understanding of treatment plan   [x]  Demonstrates knowledge/understanding of prognosis   [x]  Demonstrates acceptance of prognosis   [x]  Demonstrates knowledge/understanding of resuscitation status   []  Other (specify)  Notes:      Patients living arrangement/care setting:  Use the PRIOR COLUMN when the PATIENTS current health status necessitated a change in his/her primary residence. Prior Current Response              []             []    Patients own home/residence              []             []    Home of family member/friend              []             []    Boarding home              []             []    Assisted living facility/FDC center              []             []    Hospital/Acute care facility              []             []    Skilled nursing facility              [x]             []    Long term care facility/Nursing home              []             []    Hospice in Patient      Primary Caregiver:  []  No Primary Caregiver  Name of Primary Caregiver: Alondra  Relationship or Primary Caregiver:    []  Spouse/Significant other       [x]  Natural Child        []  Step child       []  Sibling   []  Parent   []  Friend/Neighbor   []  Community/Spiritism Volunteer   []  Paid help   []  Other (specify):___________  Notes:       Family members/Significant others:  Name:  Relationship: son  Phone Number:  Actively involved in care? [x]  Yes  []  No    Name:  Relationship: son  Phone Number:  Actively involved in care? [x]  Yes  []  No    Name:  Relationship:daughter  Phone Number:  Actively involved in care?   [x]  Yes  []  No    Social support systems: (select ONE best description)  [x]  Excellent social support system which includes three or more family members or friends  []  Good social support system which includes two or less members or friends  []  [address] support which includes one family member or friend  []  Poor social support; no family members or friends; basically ALONE  Notes:      Emotional Status: (idalmis all that apply)    Patient Caregiver Response                 [x]                [x]    Mood/Affect stable and appropriate   []                []    Angry                 []                []    Anxious                 []                []    Apprehensive                 []                []    Avoidant                 []                []    Clinging                 []                []    Depressed                 []                []    Distraught                 []                []    Elated                 []                []    Euphoric                 []                []    Fearful                 []                []    Flat Affect                 []                []    Helpless                 []                []    Hostile                 []                []    Impulsive                 []                []    Irritable                 []                []    Labile                 []                []    Manic                 []                []    Restlessness                 []                []    Sad                 []                []    Suspicious                 []                []    Tearful                 []                []    Withdrawn     Notes:     Coping Skills (strengths/weakness):    Patient: Coping Skills (strength/weakness):    Family/caregiver (strength/weakness): Family/acceptance of illness and prognosis   [place] of care (idalmis all that apply):     [x]  No burden evident   []  Family must administer medications   []  Illness causing financial strain   []  Family/Support feels overwhelmed   []  Family/Support sleep disturbed with patients care   []  Patients care causes extra physical stress  of death  []  Illness causes changes in family lifestyle  []  Illness impacting family/support employment  []  Family experiencing increased time demands  []  Patients behavior endangers family  []  Denial of patients illness  []  Concern over outcome of illness/fear  []  Patients behavior embarrassing to family   Notes:      Risk Factors: (idalmis all that apply):    [x]  No burden evident   []  Alcohol abuse  []  Financial resources inadequate to meet basic needs (food/house/etc)  []  Financial resources inadequate to meet health care needs (supplies/equipment/medications)  []  Food/nutrition resources inadequate  []  Home environment unsafe/inadequate for home care  []  Homicidal risk  []  Lives alone or without concerned relatives  []  Multiple medications/complex schedule  []  Physical limitations increase likelihood of falls  []  Plan of care/treatments complicated  []  Substance use/abuse  []  Suicidal risk  []  Visual impairment threatens safety/ability to perform self-care  []  Other (specify):     Abuse/Neglect (actual/potential risks):  [x]  No signs of abuse/neglect  []  History of abuse/neglect                 []  [id number]          []  Sexual  []  History of domestic violence  []  Lacks adequate physical care  []  Lacks emotional nurturing/support  []  Lacks appropriate stimulation/cognitive experiences  []  Left alone inappropriately  []  Lacks necessary supervision  []  Inadequate or delayed medical care  []  Unsafe environment (i.e guns/drug use/history of violence in the home/etc.)  []  Bruising or other physical signs of injury present  []  Other (specify):  Notes:   []  Refer to child/adult protective services      Current Sources of Stress (in Addition to Current Illness):   [x]  None reported  []  Bills/Debt    []  Career/Job change    []   (short term)    []   (long term)    []  Death of a child (recent)    []  Death of a parent (recent)   []  Death of a spouse (recent)   []  Employment status changed   []  Family discord    []  Financial loss/Inadequate inther (specify):come  []  Job loss  []  Legal issues unresolved  []  Lifestyle change  []  Marital discord  []  Marriage within the last year  []  Paperwork (insurance/legal/etc) overwhelming  []  Separation/Divorce  []  Other (specify):  Notes:      Current [employer] Being Utilized     None identified      Interventions/Plan of Care     1. Assess social and emotional factors related to coping with end of life issuesxx  2. Community resource planning/referral xx  3. Relocation to different care setting if/when symptoms stabilizexx    Discharge Planning     1.  Patient will most likely pass at the [facility]    MSW Assessment Completed by: Sean Nicolas  10-16-17    Time [id number]      Time [id number]

## 2017-10-18 NOTE — PROGRESS NOTES
[address] Death Visit    I was contacted by Argnetina Beth and informed that this pt had passed at approximately 8:00 AM.  Her family was expected to arrive in approximately 20 minutes. I went to her room and read Psalms and offered commendatory and petitionary prayers. When her daughter, Sae Smith and her son-in-law arrived at approximately 8:20, I returned to the room and offered my condolences and sympathy. Sae Smith had been the primary care giver for her mother since she moved approximately 11 years ago from [place], [place] to [address] [place] [address] to be closer to Sae Smith. The pt's  had passed in [place]. They shared stories and reminiscences. .   This morning, I offered words of comfort, assurance, spiritual encouragement, as well as prayer   The son of the pt, Polo,who I met yesterday, is expected to come to the [facility]. The body will be cremated in [facility] and returned to [place] for burial. I left my contact information with Sae Smith also she could contact me should she have any questions.     Donna Yañez, [facility], [facility]  [phone]

## 2017-10-25 NOTE — PROGRESS NOTES
Signed order for speech therapy faxed back to [facility] rehab services.  Confirmation received

## 2017-10-29 NOTE — DISCHARGE SUMMARY
Discharge Summary    [address] [place] [address]  [facility]  [phone]      Date of Admission: 10/14/2017  Date of Discharge: 10/18/2017    Ricky Novoa is a 80y.o. year old who was admitted to [address] [place] [address] at [facility] with a Hospice diagnosis of SEPSIS. Pt was admitted for routine hospice care with symptoms as follows   Active Symptoms:  1. Fatigue  2. No po intake  3. Excessive secretions      PLAN   1. Admit patient to Hospice under routine care. 2. Very elderly, frail woman without po intake for more than 2 weeks. 3. Prn ativan for comfort  4. On Scopolamine patch  5. Reposition patient for comfort as needed.     6.  and SW to support family needs  7. Disposition: routine care for now. Patient appears comfortable and is end of life. And treatment plan focused on comfort medications. The patient's care was focused on comfort and the patient passed away on 10/18/2017.